# Patient Record
Sex: MALE | Race: BLACK OR AFRICAN AMERICAN | NOT HISPANIC OR LATINO | Employment: FULL TIME | URBAN - METROPOLITAN AREA
[De-identification: names, ages, dates, MRNs, and addresses within clinical notes are randomized per-mention and may not be internally consistent; named-entity substitution may affect disease eponyms.]

---

## 2023-11-01 NOTE — PROGRESS NOTES
[facility]ULT [facility]NUAL [facility]    NAME: Alonso Mead  AGE: 57 y.o. SEX: male  : 1975     DATE: 2023     Assessment and Plan:     Problem List Items Addressed This Visit    None  Visit Diagnoses       Annual physical exam    -  Primary    Screening for HIV (human immunodeficiency virus)        Need for hepatitis C screening test        Encounter for vaccination        Colon cancer screening        Prostate cancer screening                Immunizations and preventive care screenings were discussed with patient today. Appropriate education was printed on patient's after visit summary. {Prostate cancer screening - consider in males between age of 37-78 depending on age, race, and risk factors. There is difference in the guidelines in regards to the optimal age for screening. USPSTF states to consider periodic screening in males between the age of 48 to 71. This text is informational and does not need to be selected but if you wish, you can insert standard documentation in your progress note by using F2 (Optional):30016}    Counseling:  {Annual Physical; Counselin}         Patient was seen today for an annual physical exam. Age appropriate screening tests were done as above. Annual labs were ordered to be done through [facility]. Patient will be contacted regarding results and follow up will be based on findings. Patient was counseled on the importance of proper diet and exercise. I recommend diets rich in lean meats and leafy green vegetables. Try to have 150 minutes of exercise weekly at moderate intensity. See problem based charting for any chronic problems or new findings and current workup/management. 30 minutes were spent on chart review, examination, and plan of care. This note was dictated using software. No follow-ups on file.      Chief Complaint:     No chief complaint on file. History of Present Illness:     Adult Annual Physical   Patient here for a comprehensive physical exam. The patient reports {problems:18632}. Patient has a history of chronic xiphoid pain. He has had a work-up by his previous provider for cardiovascular, GI, lung based causes of pain which were unremarkable. He has been seeing a pain management doctor who has him on Lyrica 50 mg 3 times a day. This does help with some of his pain symptoms however he is seeking another opinion on what can be done for his symptoms. Diet and Physical Activity  Diet/Nutrition: {annual physical; diet:}. Exercise: {annual physical; exercise:2102}. Depression Screening  PHQ-2/9 Depression Screening           General Health  Sleep: {annual physical; sleep:2102}. Hearing: {annual physical; hearin}. Vision: {annual physical; vision:}. Dental: {annual physical; dental:}.  Health  Symptoms include: {annual physical; urinary symptoms:47156::"none"}    Advanced Care Planning  Do you have an advanced directive? {YES/NO:}  Do you have a durable medical power of ? {YES/NO:}     Review of Systems:     Review of Systems   Respiratory:  Negative for shortness of breath. Cardiovascular:  Negative for chest pain. Gastrointestinal:  Negative for abdominal pain, constipation, diarrhea, nausea and vomiting. Genitourinary:  Negative for difficulty urinating. Musculoskeletal:  Positive for arthralgias. Negative for back pain. Skin:  Negative for rash. Past Medical History:     No past medical history on file. Past Surgical History:     No past surgical history on file. Family History:     No family history on file.    Social History:     Social History     Socioeconomic History    Marital status: Single     Spouse name: Not on file    Number of children: Not on file    Years of education: Not on file    Highest education level: Not on file   Occupational History    Not on file   Tobacco Use    Smoking status: Not on file    Smokeless tobacco: Not on file   Substance and Sexual Activity    Alcohol use: Not on file    Drug use: Not on file    Sexual activity: Not on file   Other Topics Concern    Not on file   Social History Narrative    Not on file     Social Determinants of Health     Financial Resource Strain: Not on file   Food Insecurity: Not on file   Transportation Needs: Not on file   Physical Activity: Not on file   Stress: Not on file   Social Connections: Not on file   Intimate Partner Violence: Not on file   Housing Stability: Not on file      Current Medications:     No current outpatient medications on file. No current facility-administered medications for this visit. Allergies:     Not on File   Physical Exam:     There were no vitals taken for this visit. Physical Exam  Constitutional:       General: He is not in acute distress. Appearance: Normal appearance. He is obese. HENT:      Head: Normocephalic and atraumatic. Right Ear: Tympanic membrane, ear canal and external ear normal.      Left Ear: Tympanic membrane, ear canal and external ear normal.      Nose: Nose normal. No congestion. Mouth/Throat:      Mouth: Mucous membranes are moist.      Pharynx: Oropharynx is clear. No oropharyngeal exudate or posterior oropharyngeal erythema. Eyes:      Extraocular Movements: Extraocular movements intact. Conjunctiva/sclera: Conjunctivae normal.      Pupils: Pupils are equal, round, and reactive to light. Cardiovascular:      Rate and Rhythm: Normal rate and regular rhythm. Heart sounds: Normal heart sounds. No murmur heard. No friction rub. No gallop. Pulmonary:      Effort: Pulmonary effort is normal. No respiratory distress. Breath sounds: Normal breath sounds. No wheezing. Abdominal:      General: Abdomen is flat. Palpations: Abdomen is soft. Tenderness:  There is no abdominal tenderness. There is no guarding. Musculoskeletal:         General: Tenderness (xiphoid process) present. No swelling. Normal range of motion. Cervical back: Normal range of motion and neck supple. Lymphadenopathy:      Cervical: No cervical adenopathy. Skin:     General: Skin is warm and dry. Findings: No erythema or rash. Neurological:      General: No focal deficit present. Mental Status: He is alert and oriented to person, place, and time. Mental status is at baseline. Psychiatric:         Mood and Affect: Mood normal.         Behavior: Behavior normal.         Thought Content:  Thought content normal.         Judgment: Judgment normal.          Yordan Butler, DO  [address] WITH [facility]

## 2023-11-10 ENCOUNTER — TELEPHONE (OUTPATIENT)
Dept: FAMILY MEDICINE CLINIC | Facility: CLINIC | Age: 48
End: 2023-11-10

## 2023-11-10 ENCOUNTER — OFFICE VISIT (OUTPATIENT)
Dept: FAMILY MEDICINE CLINIC | Facility: CLINIC | Age: 48
End: 2023-11-10

## 2023-11-10 VITALS
TEMPERATURE: 98.3 F | HEIGHT: 75 IN | SYSTOLIC BLOOD PRESSURE: 144 MMHG | WEIGHT: 278 LBS | DIASTOLIC BLOOD PRESSURE: 86 MMHG | BODY MASS INDEX: 34.57 KG/M2 | HEART RATE: 66 BPM | OXYGEN SATURATION: 98 %

## 2023-11-10 DIAGNOSIS — R07.89 XIPHODYNIA: Primary | ICD-10-CM

## 2023-11-10 DIAGNOSIS — R07.89 OTHER CHEST PAIN: ICD-10-CM

## 2023-11-10 PROCEDURE — 99203 OFFICE O/P NEW LOW 30 MIN: CPT | Performed by: STUDENT IN AN ORGANIZED HEALTH CARE EDUCATION/TRAINING PROGRAM

## 2023-11-10 RX ORDER — ASCORBIC ACID 125 MG
TABLET,CHEWABLE ORAL
COMMUNITY

## 2023-11-10 RX ORDER — FLUTICASONE PROPIONATE 220 UG/1
AEROSOL, METERED RESPIRATORY (INHALATION)
COMMUNITY

## 2023-11-10 RX ORDER — BUSPIRONE HYDROCHLORIDE 15 MG/1
1 TABLET ORAL 3 TIMES DAILY
COMMUNITY
Start: 2023-09-29

## 2023-11-10 RX ORDER — OMEPRAZOLE 20 MG/1
CAPSULE, DELAYED RELEASE ORAL
COMMUNITY

## 2023-11-10 NOTE — PROGRESS NOTES
Name: Sam Ring      : 1975      MRN: [id number]  Encounter Provider: Evon Schilling DO  Encounter Date: 11/10/2023   Encounter department: [address] [place] [address] IN PARTNERSHIP WITH [facility]    Assessment & Plan     1. Xiphodynia  -     CT chest wo contrast; Future; Expected date: 11/10/2023    2. Other chest pain        Depression Screening and Follow-up Plan: Patient was screened for depression during today's encounter. They screened negative with a PHQ-2 score of 0. Patient is a 61-year-old male who is presenting today for a second opinion/evaluation for his chest pain. Review of his records over the past year shows that he has had an extensive cardiac evaluation along with basic x-ray imaging of the chest and epigastric evaluation with EGD. No causes of chest pain have been found at this time. He does have a history of COVID and describes some pain in the chest on deep breathing. His palpable pain is August around the xiphoid process. However I still have some concern about his lungs due to the pain on inspiration and he has had no detailed imaging since he had COVID in . I recommended a CT scan to evaluate the lungs. I will do a further review of his previous records. I did recommend a evaluation by a chiropractor as his symptoms may improve with an adjustment of the ribs. He will follow-up in 1 month for an annual visit and we will consider further evaluation/treatment at that time. 30 minutes spent on physical exam and plan of care. This note was dictated using software. Subjective      HPI  Review of Systems   Respiratory:  Negative for shortness of breath. Cardiovascular:  Negative for chest pain. Chest wall pain,pain on inspiration   Gastrointestinal:  Negative for abdominal pain. Genitourinary:  Negative for dysuria. Musculoskeletal:  Positive for arthralgias. Skin:  Negative for rash.      Patient is a 61-year-old male presenting for an acute visit to discuss chronic problems with chest pain focused around the xiphoid process. He states that he had COVID several years ago and then developed a pain in his chest which started about a year and a half prior. He was being evaluated by his previous primary care doctor who had patient do a heart evaluation with echo and stress testing. Cardiac testing was negative. He had an evaluation by gastrointestinal doctor who did an upper endoscopy at the same time as colonoscopy and findings were also negative. He has had recent x-rays of the sternum which were negative for acute findings. He also had an evaluation by a pain doctor who recommended possible injections however patient was nervous with having injections near the lungs. He is here today to discuss further evaluation or possibly a second opinion. Current Outpatient Medications on File Prior to Visit   Medication Sig    Albuterol-Budesonide 90-80 MCG/ACT AERO     busPIRone (BUSPAR) 15 mg tablet Take 1 tablet by mouth 3 (three) times a day    fluticasone (Flovent HFA) 220 mcg/act inhaler     Multiple Vitamins-Minerals (Multi Adult Gummies) CHEW     omeprazole (PriLOSEC) 20 mg delayed release capsule        Objective     /86 (BP Location: Right arm, Patient Position: Sitting, Cuff Size: Standard)   Pulse 66   Temp 98.3 °F (36.8 °C)   Ht 6' 3" (1.905 m)   Wt 126 kg (278 lb)   SpO2 98%   BMI 34.75 kg/m²     Physical Exam  Vitals and nursing note reviewed. Constitutional:       General: He is not in acute distress. Appearance: Normal appearance. He is not ill-appearing. HENT:      Head: Normocephalic and atraumatic. Right Ear: External ear normal.      Left Ear: External ear normal.      Nose: Nose normal.      Mouth/Throat:      Mouth: Mucous membranes are moist.   Eyes:      Extraocular Movements: Extraocular movements intact.       Conjunctiva/sclera: Conjunctivae normal.      Pupils: Pupils are equal, round, and reactive to light. Cardiovascular:      Rate and Rhythm: Normal rate and regular rhythm. Heart sounds: Normal heart sounds. No murmur heard. No friction rub. No gallop. Pulmonary:      Effort: Pulmonary effort is normal. No respiratory distress. Breath sounds: Normal breath sounds. No wheezing. Musculoskeletal:         General: Tenderness (tenderness focused around xiphoid process) present. Normal range of motion. Cervical back: Normal range of motion. Skin:     General: Skin is warm. Findings: No rash. Neurological:      General: No focal deficit present. Mental Status: He is alert and oriented to person, place, and time. Mental status is at baseline. Psychiatric:         Mood and Affect: Mood normal.         Behavior: Behavior normal.         Thought Content:  Thought content normal.         Judgment: Judgment normal.       Jone Schaeffer DO

## 2023-11-10 NOTE — TELEPHONE ENCOUNTER
[address] [id number]  TRACK [id number]    KACYD   11/10/23-12/10/23    CT TO BE COMPLETED AT [facility]

## 2023-12-04 ENCOUNTER — HOSPITAL ENCOUNTER (OUTPATIENT)
Dept: CT IMAGING | Facility: HOSPITAL | Age: 48
Discharge: HOME | End: 2023-12-04
Attending: STUDENT IN AN ORGANIZED HEALTH CARE EDUCATION/TRAINING PROGRAM
Payer: COMMERCIAL

## 2023-12-04 DIAGNOSIS — R07.89 XIPHODYNIA: ICD-10-CM

## 2023-12-04 PROCEDURE — G1004 CDSM NDSC: HCPCS

## 2023-12-04 PROCEDURE — 71250 CT THORAX DX C-: CPT

## 2023-12-05 PROBLEM — I10 HIGH BLOOD PRESSURE: Status: ACTIVE | Noted: 2023-12-05

## 2023-12-05 PROBLEM — F41.9 ANXIETY: Status: ACTIVE | Noted: 2020-09-14

## 2023-12-05 PROBLEM — J45.909 ASTHMA: Status: ACTIVE | Noted: 2023-12-05

## 2023-12-05 NOTE — PROGRESS NOTES
ADULT ANNUAL [facility] [place] [facility]    NAME: Laura Aguirre  AGE: 57 y.o. SEX: male  : 1975     DATE: 12/15/2023     Assessment and Plan:     Problem List Items Addressed This Visit       Xiphodynia     Patient has had a chronic xiphoid pain since after having COVID infection. He has been worked up with a cardiac workup, CT chest imaging, respiratory specialist evaluation. There were no cardiovascular abnormalities. 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 I did recommend trying an adjustment by chiropractor first.  He has a lot of hesitancy about seeing a pain specialist for injections as he is worried about puncturing the lung. We may consider talking about Cymbalta if chiropractor is not effective. I also recommended weight loss due to body habitus. Patient will work with our care manager on weight loss. Anxiety     Anxiety is chronic and currently controlled on BuSpar 15 mg 3 times daily. Asthma     Follows with specialty. He has had breathing problems since COVID. Continue on current inhalers. High blood pressure     Blood pressure in office is reduced today. Patient is not on any medications. Will refer to care management to work on diet and nutrition         Relevant Orders    POCT hemoglobin A1c    CBC and differential    Basic metabolic panel    Prediabetes     A1c was 5.8 today. Will refer to care management for diet and nutrition as patient is interested in weight loss tips.          Relevant Orders    Ambulatory referral to complex care management program     Other Visit Diagnoses       Annual physical exam    -  Primary    Relevant Orders    POCT hemoglobin A1c    CBC and differential    Basic metabolic panel    Encounter for vaccination        Flu shot declined    Colon cancer screening        Up to date    Prostate cancer screening        Relevant Orders    PSA, Total Screen    Obesity (BMI 30.0-34. 9)        Relevant Orders    Ambulatory referral to complex care management program            Immunizations and preventive care screenings were discussed with patient today. Appropriate education was printed on patient's after visit summary. Discussed risks and benefits of prostate cancer screening. We discussed the controversial history of PSA screening for prostate cancer in the [facility] as well as the risk of over detection and over treatment of prostate cancer by way of PSA screening. The patient understands that PSA blood testing is an imperfect way to screen for prostate cancer and that elevated PSA levels in the blood may also be caused by infection, inflammation, prostatic trauma or manipulation, urological procedures, or by benign prostatic enlargement. The role of the digital rectal examination in prostate cancer screening was also discussed and I discussed with him that there is large interobserver variability in the findings of digital rectal examination. Counseling:  Alcohol/drug use: discussed moderation in alcohol intake, the recommendations for healthy alcohol use, and avoidance of illicit drug use. Dental Health: discussed importance of regular tooth brushing, flossing, and dental visits. Exercise: the importance of regular exercise/physical activity was discussed. Recommend exercise 3-5 times per week for at least 30 minutes. BMI Counseling: Body mass index is 34.97 kg/m². The BMI is above normal. Nutrition recommendations include encouraging healthy choices of fruits and vegetables. Exercise recommendations include moderate physical activity 150 minutes/week. Rationale for BMI follow-up plan is due to patient being overweight or obese. Return in about 3 months (around 3/15/2024) for Recheck, a1c and weight check.      Chief Complaint:     Chief Complaint   Patient presents with    Physical Exam     Est care  A1c        History of Present Illness:     Adult Annual Physical   Patient here for a comprehensive physical exam. The patient reports  follow up xiphoid pain, chronic conditions . Diet and Physical Activity  Diet/Nutrition:  meats and veggies, low salt . Exercise: walking. Depression Screening  PHQ-2/9 Depression Screening           General Health  Sleep: sleeps well. Hearing: normal - bilateral.  Vision:  needs reading glasses . Dental: regular dental visits.  Health  Symptoms include: none         Review of Systems:     Review of Systems   Respiratory:  Negative for shortness of breath. Cardiovascular:  Negative for chest pain. Gastrointestinal:  Negative for abdominal pain, constipation and diarrhea. Genitourinary:  Negative for difficulty urinating. Musculoskeletal:  Positive for arthralgias. Skin:  Negative for rash.       Past Medical History:     Past Medical History:   Diagnosis Date    Anxiety     Asthma     Hypertension       Past Surgical History:     Past Surgical History:   Procedure Laterality Date    KNEE SURGERY        Family History:     Family History   Problem Relation Age of Onset    Alcohol abuse Father     Diabetes Maternal Grandmother     Breast cancer Sister       Social History:     Social History     Socioeconomic History    Marital status: Single     Spouse name: None    Number of children: None    Years of education: None    Highest education level: None   Occupational History    None   Tobacco Use    Smoking status: Former     Current packs/day: 0.25     Average packs/day: 0.3 packs/day for 10.0 years (2.5 ttl pk-yrs)     Types: Cigarettes    Smokeless tobacco: Never   Substance and Sexual Activity    Alcohol use: Never    Drug use: Never    Sexual activity: Yes     Partners: Female     Birth control/protection: OCP   Other Topics Concern    None   Social History Narrative    None     Social Determinants of Health     Financial Resource Strain: Low Risk  (7/20/2023)    Received from [address] Strain (CARDIA)     Difficulty of Paying Living Expenses: Not hard at all   Food Insecurity: No Food Insecurity (7/20/2023)    Received from [address] Vital Sign     Worried About [place] in the Last Year: Never true     [address] in the Last Year: Never true   Transportation Needs: No Transportation Needs (7/20/2023)    Received from [facility] of Transportation (Medical): No     Lack of Transportation (Non-Medical): No   Physical Activity: Sufficiently Active (7/20/2023)    Received from [facility]    Exercise Vital Sign     Days of Exercise per Week: 3 days     Minutes of Exercise per Session: 60 min   Stress: No Stress Concern Present (7/20/2023)    Received from [email or web address]     Feeling of Stress : Only a little   Social Connections:  Moderately Integrated (7/20/2023)    Received from [address] [facility] and Isolation Panel [NHANES]     Frequency of Communication with Friends and Family: More than three times a week     Frequency of Social Gatherings with Friends and Family: More than three times a week     Attends Denominational Services: Never     Active Member of Clubs or Organizations: Yes     Attends Club or Organization Meetings: More than 4 times per year     Marital Status: Living with partner   Intimate Partner Violence: Not At Risk (7/20/2023)    Received from [employer], [address], Rape, and Kick questionnaire     Fear of Current or Ex-Partner: No     Emotionally Abused: No     Physically Abused: No     Sexually Abused: No   Housing Stability: Low Risk  (7/20/2023)    Received from [facility] Stability Vital Sign     Unable to Pay for Housing in the Last Year: No     Number of Places Lived in the Last Year: 1     Unstable Housing in the Last Year: No      Current Medications:     Current Outpatient Medications   Medication Sig Dispense Refill    Albuterol-Budesonide 90-80 MCG/ACT AERO       busPIRone (BUSPAR) 15 mg tablet Take 1 tablet by mouth 3 (three) times a day      fluticasone (Flovent HFA) 220 mcg/act inhaler       Multiple Vitamins-Minerals (Multi Adult Gummies) CHEW       omeprazole (PriLOSEC) 20 mg delayed release capsule        No current facility-administered medications for this visit. Allergies:     No Known Allergies   Physical Exam:     /82 (BP Location: Left arm, Patient Position: Sitting, Cuff Size: Large)   Pulse 74   Ht 6' 3" (1.905 m)   Wt 127 kg (279 lb 12.8 oz)   SpO2 97%   BMI 34.97 kg/m²     Physical Exam  Constitutional:       General: He is not in acute distress. Appearance: Normal appearance. He is obese. HENT:      Head: Normocephalic and atraumatic. Right Ear: Tympanic membrane, ear canal and external ear normal.      Left Ear: Tympanic membrane, ear canal and external ear normal.      Nose: Nose normal. No congestion. Mouth/Throat:      Mouth: Mucous membranes are moist.      Pharynx: Oropharynx is clear. No oropharyngeal exudate or posterior oropharyngeal erythema. Eyes:      Extraocular Movements: Extraocular movements intact. Conjunctiva/sclera: Conjunctivae normal.      Pupils: Pupils are equal, round, and reactive to light. Cardiovascular:      Rate and Rhythm: Normal rate and regular rhythm. Heart sounds: Normal heart sounds. No murmur heard. No friction rub. No gallop. Pulmonary:      Effort: Pulmonary effort is normal. No respiratory distress. Breath sounds: Normal breath sounds. No wheezing. Abdominal:      General: Abdomen is flat. Palpations: Abdomen is soft. Tenderness: There is no abdominal tenderness. There is no guarding. Musculoskeletal:         General: Tenderness (xiphoid tenderness) present. Normal range of motion. Cervical back: Normal range of motion and neck supple. Lymphadenopathy:      Cervical: No cervical adenopathy. Skin:     General: Skin is warm and dry. Findings: No erythema or rash. Neurological:      General: No focal deficit present. Mental Status: He is alert and oriented to person, place, and time. Mental status is at baseline. Psychiatric:         Mood and Affect: Mood normal.         Behavior: Behavior normal.         Thought Content:  Thought content normal.         Judgment: Judgment normal.          Marlinda Hockey, DO  [address] WITH [facility]

## 2023-12-13 ENCOUNTER — TELEPHONE (OUTPATIENT)
Dept: ADMINISTRATIVE | Facility: OTHER | Age: 48
End: 2023-12-13

## 2023-12-13 NOTE — LETTER
Procedure Request Form: Colonoscopy      Date Requested: 23  Patient: Anam Ch  Patient : 1975   Referring Provider: No primary care provider on file. Date of Procedure ______________________________       The above patient has informed us that they have completed their   most recent Colonoscopy at your facility. Please complete   this form and attach all corresponding procedure reports/results. Comments __________________________________________________________  ____________________________________________________________________  ____________________________________________________________________  ____________________________________________________________________    Facility Completing Procedure _________________________________________    Form Completed By (print name) _______________________________________      Signature __________________________________________________________      These reports are needed for  compliance. Please fax this completed form and a copy of the procedure report to our office located at [address] [place] [address] as soon as possible to Fax [phone] attention Gregory Medley: Phone [phone]    We thank you for your assistance in treating our mutual patient.

## 2023-12-13 NOTE — TELEPHONE ENCOUNTER
Upon review of the In Basket request and the patient's chart, initial outreach has been made via fax to facility. Please see Contacts section for details.      Thank you  Clyde Ashley

## 2023-12-13 NOTE — TELEPHONE ENCOUNTER
----- Message from Mando Lim sent at 12/13/2023 10:01 AM EST -----  Regarding: care gap request  01/04/23 1:48 PM    Hello, our patient attached above CRC: Colonoscopy completed/performed. Please assist in updating the patient chart by pulling the Care Everywhere (CE) document. The date of service is 12/13/23.      Thank you,  Mando Lim  [facility]

## 2023-12-14 NOTE — TELEPHONE ENCOUNTER
Upon review of the In Basket request we were able to locate, review, and update the patient chart as requested for CRC: Colonoscopy. Repeat colonoscopy 5 years, health maintenance has been updated. Any additional questions or concerns should be emailed to the Practice Liaisons via the appropriate education email address, please do not reply via In Basket.     Thank you  Brandie Marie

## 2023-12-15 ENCOUNTER — OFFICE VISIT (OUTPATIENT)
Dept: FAMILY MEDICINE CLINIC | Facility: CLINIC | Age: 48
End: 2023-12-15

## 2023-12-15 VITALS
DIASTOLIC BLOOD PRESSURE: 82 MMHG | BODY MASS INDEX: 34.79 KG/M2 | HEIGHT: 75 IN | HEART RATE: 74 BPM | WEIGHT: 279.8 LBS | OXYGEN SATURATION: 97 % | SYSTOLIC BLOOD PRESSURE: 130 MMHG

## 2023-12-15 DIAGNOSIS — R07.89 XIPHODYNIA: ICD-10-CM

## 2023-12-15 DIAGNOSIS — Z12.5 PROSTATE CANCER SCREENING: ICD-10-CM

## 2023-12-15 DIAGNOSIS — Z00.00 ANNUAL PHYSICAL EXAM: Primary | ICD-10-CM

## 2023-12-15 DIAGNOSIS — I10 HYPERTENSION, UNSPECIFIED TYPE: ICD-10-CM

## 2023-12-15 DIAGNOSIS — R73.03 PREDIABETES: ICD-10-CM

## 2023-12-15 DIAGNOSIS — E66.9 OBESITY (BMI 30.0-34.9): ICD-10-CM

## 2023-12-15 DIAGNOSIS — F41.9 ANXIETY: ICD-10-CM

## 2023-12-15 DIAGNOSIS — Z12.11 COLON CANCER SCREENING: ICD-10-CM

## 2023-12-15 DIAGNOSIS — J45.909 UNCOMPLICATED ASTHMA, UNSPECIFIED ASTHMA SEVERITY, UNSPECIFIED WHETHER PERSISTENT: ICD-10-CM

## 2023-12-15 DIAGNOSIS — Z23 ENCOUNTER FOR VACCINATION: ICD-10-CM

## 2023-12-15 LAB — SL AMB POCT HEMOGLOBIN AIC: 5.8 (ref ?–6.5)

## 2023-12-15 PROCEDURE — 99396 PREV VISIT EST AGE 40-64: CPT | Performed by: STUDENT IN AN ORGANIZED HEALTH CARE EDUCATION/TRAINING PROGRAM

## 2023-12-15 PROCEDURE — 99213 OFFICE O/P EST LOW 20 MIN: CPT | Performed by: STUDENT IN AN ORGANIZED HEALTH CARE EDUCATION/TRAINING PROGRAM

## 2023-12-15 PROCEDURE — 83036 HEMOGLOBIN GLYCOSYLATED A1C: CPT | Performed by: STUDENT IN AN ORGANIZED HEALTH CARE EDUCATION/TRAINING PROGRAM

## 2023-12-15 NOTE — ASSESSMENT & PLAN NOTE
A1c was 5.8 today. Will refer to care management for diet and nutrition as patient is interested in weight loss tips.

## 2023-12-15 NOTE — ASSESSMENT & PLAN NOTE
Blood pressure in office is reduced today. Patient is not on any medications.   Will refer to care management to work on diet and nutrition

## 2023-12-15 NOTE — ASSESSMENT & PLAN NOTE
Patient has had a chronic xiphoid pain since after having COVID infection. He has been worked up with a cardiac workup, CT chest imaging, respiratory specialist evaluation. There were no cardiovascular abnormalities. 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 I did recommend trying an adjustment by chiropractor first.  He has a lot of hesitancy about seeing a pain specialist for injections as he is worried about puncturing the lung. We may consider talking about Cymbalta if chiropractor is not effective. I also recommended weight loss due to body habitus. Patient will work with our care manager on weight loss.

## 2023-12-18 ENCOUNTER — PATIENT OUTREACH (OUTPATIENT)
Dept: FAMILY MEDICINE CLINIC | Facility: CLINIC | Age: 48
End: 2023-12-18

## 2023-12-21 ENCOUNTER — PATIENT OUTREACH (OUTPATIENT)
Dept: FAMILY MEDICINE CLINIC | Facility: CLINIC | Age: 48
End: 2023-12-21

## 2023-12-21 NOTE — PROGRESS NOTES
Patient answered phone during scheduled outreach call, but then stated he would call back in 6 min. 30 min later he did not call back. [employer] sent to patient to reschedule outreach. If no response will reach out again in 2 weeks.

## 2023-12-28 ENCOUNTER — PATIENT OUTREACH (OUTPATIENT)
Dept: FAMILY MEDICINE CLINIC | Facility: CLINIC | Age: 48
End: 2023-12-28

## 2023-12-28 NOTE — PROGRESS NOTES
Patient called and left message to return call from missed outreach 12/21. Called patient, he did not answer. Message left to either return call or reply to [employer] message. [employer] # provided if he is having problems accessing. Office hours left as well. Will await his return call to reschedule initial call.

## 2024-01-10 ENCOUNTER — PATIENT OUTREACH (OUTPATIENT)
Dept: FAMILY MEDICINE CLINIC | Facility: CLINIC | Age: 49
End: 2024-01-10

## 2024-01-10 NOTE — PROGRESS NOTES
Patient initially answered and scheduled a call. On day of call patient answered, but stated he couldn't talk and would call back in 5 min. He called 2 days later and left message that he was available anytime between Christmas and New Year break. CM called again during this time, patient did not answer, message left and mychart message sent. Patient has failed to engage. Message sent letting patient know his cased is closed due to inactivity at this time.

## 2024-01-23 ENCOUNTER — PATIENT OUTREACH (OUTPATIENT)
Age: 49
End: 2024-01-23

## 2024-01-23 NOTE — PROGRESS NOTES
Patient was referred previously to care management, but missed one outreach and never scheduled again. Case was closed 1/10. Patient reached out today to come back. Due to closure over 10days new referral is needed. Sent response that he reach out to provider for new referral.

## 2024-02-07 DIAGNOSIS — R73.03 PREDIABETES: ICD-10-CM

## 2024-02-07 DIAGNOSIS — E66.9 OBESITY (BMI 30.0-34.9): Primary | ICD-10-CM

## 2024-02-08 ENCOUNTER — PATIENT OUTREACH (OUTPATIENT)
Dept: FAMILY MEDICINE CLINIC | Facility: CLINIC | Age: 49
End: 2024-02-08

## 2024-02-22 ENCOUNTER — PATIENT OUTREACH (OUTPATIENT)
Dept: FAMILY MEDICINE CLINIC | Facility: CLINIC | Age: 49
End: 2024-02-22

## 2024-02-22 NOTE — PROGRESS NOTES
Called patient 2/8 and sent [employer] message in regards to second referral placed for CM. Prediabetes DX. Unable to reach letter sent via [employer] today. No further attempts will be made.

## 2024-02-22 NOTE — LETTER
Date: 02/22/24    Dear Wil Anaya,   I have not been able to reach you and would like to set a time that I can talk with you over the phone or in-person.  Please call me or respond to the my chart message in order to schedule an outreach call.   Sincerely,  Vera MSN RN  [phone]  Outpatient Care Manager

## 2024-02-27 ENCOUNTER — PATIENT OUTREACH (OUTPATIENT)
Age: 49
End: 2024-02-27

## 2024-02-27 NOTE — PROGRESS NOTES
Patient returned call from second referral for CM services for pre-diabetes. Left message for best time to reach. Cm returned call,Intial outreach scheduled 3/5

## 2024-03-05 ENCOUNTER — PATIENT OUTREACH (OUTPATIENT)
Age: 49
End: 2024-03-05

## 2024-03-05 NOTE — PROGRESS NOTES
Spoke with patient during scheduled outreach. Patient is fair with water drinking at least 64oz a day, no coffee, tea or soda. Patient did state that on a weekend he can easily drink a 6 pack + of beer. Explained hydration protocol and asked him to increase water and compensate for beer on weekend. Provided overview of calorie tracking and requested that he send calories for review and response 3/11. Patient verbalized understanding. Next outreach scheduled for 3/25.

## 2024-03-05 NOTE — PROGRESS NOTES
Assessment/Plan:    High blood pressure  Patient is currently doing well with blood pressure and is not on medications currently.  Will continue to monitor intermittently.    Xiphodynia  Patient's chronic xiphoid pain has been improving as he is slowly becoming more active and exercising.  He feels comforted now that he knows that this is not heart related and has been trying to change his diet as well working with our care management.  He does feel better overall mentally and physically.    Prediabetes  Continue to follow with care management.  I do believe a large portion of his sugar is due to alcohol use.  He does not drink during the week but on the weekends he may have 6 beers on 1 day.  I believe this is also the cause of his low potassium.  I did recommend to continue a multivitamin that contains potassium daily.       Diagnoses and all orders for this visit:    Obesity (BMI 30.0-34.9)    Hypertension, unspecified type    Prediabetes  -     POCT hemoglobin A1c    Xiphodynia    Other orders  -     meloxicam (MOBIC) 15 mg tablet; Take 15 mg by mouth daily (Patient not taking: Reported on 3/15/2024)          Patient is a 49-year-old male who is presenting today for follow-up on weight and A1c today.  Weight has not improved since last visit.  He did just start working with care management and is starting to track his calories.  I did encourage him that it will take some time to notice changes from diet and lifestyle.  I did recommend to try and cut down on the amount of alcohol he drinks on the weekends.  He had a question about meloxicam and I did advise him that it should not interact with his buspirone.  I did recommend taking it with food and not using with other NSAIDs.  He should use this for flareups and use Tylenol for any low amounts of aches or pains.  He should continue to stay active.  As of right now I will plan to recheck his A1c in 4 to 5 months.  He will need some more time to continue working  with care management before we recheck this.    20 minutes spent on physical exam and plan of care.  This note was dictated using software.    Subjective:      Patient ID: Wil Anaya is a 49 y.o. male.    HPI    The following portions of the patient's history were reviewed and updated as appropriate: allergies, current medications, past family history, past medical history, past social history, past surgical history, and problem list.    Patient is a 49-year-old male who is presenting today for follow-up on obesity, prediabetes.  He was referred to care management.    We are also monitoring patient's blood pressure and he continues to have symptoms of xiphoid pain.  I recommended possible evaluation by chiropractor or medical massage therapy.  I do believe some weight loss might also assist with his symptoms.    Patient states that he has been feeling better since starting to exercise and working with our care manager.  He has been starting to try and decrease his daily calorie intake.  A large portion of his calorie intake probably comes from his alcohol use on the weekends.  I do believe this also plays a part in his some low potassium.  He is currently trying to track and control his calories at 0380-8080 a day.  I also recommended he try to decrease alcohol use to 4-5 beers a day on the weekends to start.    He also has a question about starting meloxicam for pain in his knee.  He was given this by an orthopedic doctor and wanted to know if there were any interactions with his buspirone.      Review of Systems   Respiratory:  Negative for shortness of breath.    Cardiovascular:  Negative for chest pain.   Gastrointestinal:  Negative for abdominal pain, constipation and diarrhea.   Genitourinary:  Negative for difficulty urinating.   Musculoskeletal:  Positive for arthralgias.   Skin:  Negative for rash.         Objective:      /84 (BP Location: Right arm, Patient Position: Sitting, Cuff Size: Large)   " Pulse 73   Temp 97.8 °F (36.6 °C)   Ht 6' 3\" (1.905 m)   Wt 128 kg (282 lb 12.8 oz)   SpO2 98%   BMI 35.35 kg/m²          Physical Exam  Vitals and nursing note reviewed.   Constitutional:       General: He is not in acute distress.     Appearance: Normal appearance.   HENT:      Head: Normocephalic and atraumatic.      Right Ear: External ear normal.      Left Ear: External ear normal.      Nose: Nose normal.      Mouth/Throat:      Mouth: Mucous membranes are moist.      Pharynx: Oropharynx is clear.   Eyes:      Extraocular Movements: Extraocular movements intact.      Conjunctiva/sclera: Conjunctivae normal.      Pupils: Pupils are equal, round, and reactive to light.   Cardiovascular:      Rate and Rhythm: Normal rate and regular rhythm.      Heart sounds: Normal heart sounds. No murmur heard.     No friction rub. No gallop.   Pulmonary:      Effort: Pulmonary effort is normal. No respiratory distress.      Breath sounds: Normal breath sounds. No wheezing.   Musculoskeletal:         General: Normal range of motion.      Cervical back: Normal range of motion.   Skin:     General: Skin is warm and dry.      Findings: No rash.   Neurological:      General: No focal deficit present.      Mental Status: He is alert and oriented to person, place, and time. Mental status is at baseline.   Psychiatric:         Mood and Affect: Mood normal.         Behavior: Behavior normal.         Thought Content: Thought content normal.         Judgment: Judgment normal.           "

## 2024-03-11 ENCOUNTER — PATIENT OUTREACH (OUTPATIENT)
Age: 49
End: 2024-03-11

## 2024-03-11 NOTE — PROGRESS NOTES
Patient sent daily calories for review and response. He ranged anywhere from 1902 to 3209. Suggested a daily calorie of 9620-0382. Next outreach scheduled 3/25

## 2024-03-14 LAB
BASOPHILS # BLD AUTO: 28 CELLS/UL (ref 0–200)
BASOPHILS NFR BLD AUTO: 0.5 %
BUN SERPL-MCNC: 15 MG/DL (ref 7–25)
BUN/CREAT SERPL: ABNORMAL (CALC) (ref 6–22)
CALCIUM SERPL-MCNC: 9.4 MG/DL (ref 8.6–10.3)
CHLORIDE SERPL-SCNC: 100 MMOL/L (ref 98–110)
CO2 SERPL-SCNC: 31 MMOL/L (ref 20–32)
CREAT SERPL-MCNC: 0.93 MG/DL (ref 0.6–1.29)
EOSINOPHIL # BLD AUTO: 138 CELLS/UL (ref 15–500)
EOSINOPHIL NFR BLD AUTO: 2.5 %
ERYTHROCYTE [DISTWIDTH] IN BLOOD BY AUTOMATED COUNT: 12.9 % (ref 11–15)
GFR/BSA.PRED SERPLBLD CYS-BASED-ARV: 102 ML/MIN/1.73M2
GLUCOSE SERPL-MCNC: 105 MG/DL (ref 65–99)
HCT VFR BLD AUTO: 45.6 % (ref 38.5–50)
HGB BLD-MCNC: 15.4 G/DL (ref 13.2–17.1)
LYMPHOCYTES # BLD AUTO: 1716 CELLS/UL (ref 850–3900)
LYMPHOCYTES NFR BLD AUTO: 31.2 %
MCH RBC QN AUTO: 30.3 PG (ref 27–33)
MCHC RBC AUTO-ENTMCNC: 33.8 G/DL (ref 32–36)
MCV RBC AUTO: 89.6 FL (ref 80–100)
MONOCYTES # BLD AUTO: 369 CELLS/UL (ref 200–950)
MONOCYTES NFR BLD AUTO: 6.7 %
NEUTROPHILS # BLD AUTO: 3251 CELLS/UL (ref 1500–7800)
NEUTROPHILS NFR BLD AUTO: 59.1 %
PLATELET # BLD AUTO: 213 THOUSAND/UL (ref 140–400)
PMV BLD REES-ECKER: 10.9 FL (ref 7.5–12.5)
POTASSIUM SERPL-SCNC: 3.3 MMOL/L (ref 3.5–5.3)
PSA SERPL-MCNC: 0.51 NG/ML
RBC # BLD AUTO: 5.09 MILLION/UL (ref 4.2–5.8)
SODIUM SERPL-SCNC: 140 MMOL/L (ref 135–146)
WBC # BLD AUTO: 5.5 THOUSAND/UL (ref 3.8–10.8)

## 2024-03-15 ENCOUNTER — OFFICE VISIT (OUTPATIENT)
Dept: FAMILY MEDICINE CLINIC | Facility: CLINIC | Age: 49
End: 2024-03-15

## 2024-03-15 VITALS
BODY MASS INDEX: 35.16 KG/M2 | HEIGHT: 75 IN | WEIGHT: 282.8 LBS | TEMPERATURE: 97.8 F | OXYGEN SATURATION: 98 % | HEART RATE: 73 BPM | DIASTOLIC BLOOD PRESSURE: 84 MMHG | SYSTOLIC BLOOD PRESSURE: 128 MMHG

## 2024-03-15 DIAGNOSIS — R07.89 XIPHODYNIA: ICD-10-CM

## 2024-03-15 DIAGNOSIS — E66.9 OBESITY (BMI 30.0-34.9): Primary | ICD-10-CM

## 2024-03-15 DIAGNOSIS — R73.03 PREDIABETES: ICD-10-CM

## 2024-03-15 DIAGNOSIS — I10 HYPERTENSION, UNSPECIFIED TYPE: ICD-10-CM

## 2024-03-15 LAB — SL AMB POCT HEMOGLOBIN AIC: 5.8 (ref ?–6.5)

## 2024-03-15 PROCEDURE — 99213 OFFICE O/P EST LOW 20 MIN: CPT | Performed by: STUDENT IN AN ORGANIZED HEALTH CARE EDUCATION/TRAINING PROGRAM

## 2024-03-15 PROCEDURE — 83036 HEMOGLOBIN GLYCOSYLATED A1C: CPT | Performed by: STUDENT IN AN ORGANIZED HEALTH CARE EDUCATION/TRAINING PROGRAM

## 2024-03-15 RX ORDER — MELOXICAM 15 MG/1
15 TABLET ORAL DAILY
COMMUNITY
Start: 2024-02-20 | End: 2024-05-20

## 2024-03-15 NOTE — ASSESSMENT & PLAN NOTE
Continue to follow with care management.  I do believe a large portion of his sugar is due to alcohol use.  He does not drink during the week but on the weekends he may have 6 beers on 1 day.  I believe this is also the cause of his low potassium.  I did recommend to continue a multivitamin that contains potassium daily.

## 2024-03-15 NOTE — ASSESSMENT & PLAN NOTE
Patient is currently doing well with blood pressure and is not on medications currently.  Will continue to monitor intermittently.

## 2024-03-15 NOTE — ASSESSMENT & PLAN NOTE
Patient's chronic xiphoid pain has been improving as he is slowly becoming more active and exercising.  He feels comforted now that he knows that this is not heart related and has been trying to change his diet as well working with our care management.  He does feel better overall mentally and physically.

## 2024-03-25 ENCOUNTER — PATIENT OUTREACH (OUTPATIENT)
Age: 49
End: 2024-03-25

## 2024-03-25 NOTE — PROGRESS NOTES
Spoke with patient during scheduled outreach. He was below the suggested calorie goal most days. He was only over with the 3 beers on the weekend. Talked about lighter beer options. Also, stated that calories need to be more consistent. Patient felt that he could be more consistent with a goal of 6373-0230. Talked about alternate food options and things to drive down the calories. Next outreach 4/22

## 2024-04-22 ENCOUNTER — PATIENT OUTREACH (OUTPATIENT)
Age: 49
End: 2024-04-22

## 2024-04-22 NOTE — PROGRESS NOTES
Called patient during scheduled outreach time. No answer, voicemail left with callback information. My Chart message sent with response request for best day and time to reschedule outreach. Will reach out again if no response.

## 2024-04-30 ENCOUNTER — PATIENT OUTREACH (OUTPATIENT)
Age: 49
End: 2024-04-30

## 2024-04-30 NOTE — PROGRESS NOTES
Spoke with patient during scheduled outreach. Over the last month patient was trending his daily calories down and settled around 1600. Talked about daily consistency being the key to change and sticking with that for now. Also, covered macros and provided overview with % and daily grams. Patient asked that CM send to him in [employer] message. He seems to have the fat at a good spot but protein was low. Talked about utilizing the protein shakes to help boost. He is still working on finding a better tasting light beer! Next phone outreach 5/30

## 2024-05-21 DIAGNOSIS — F41.9 ANXIETY: Primary | ICD-10-CM

## 2024-05-22 RX ORDER — BUSPIRONE HYDROCHLORIDE 15 MG/1
15 TABLET ORAL 2 TIMES DAILY
Qty: 180 TABLET | Refills: 3 | Status: SHIPPED | OUTPATIENT
Start: 2024-05-22

## 2024-05-30 ENCOUNTER — PATIENT OUTREACH (OUTPATIENT)
Dept: FAMILY MEDICINE CLINIC | Facility: CLINIC | Age: 49
End: 2024-05-30

## 2024-05-30 NOTE — PROGRESS NOTES
Spoke with patient during scheduled outreach. He is down to 261 per home scale losing over 20lbs. He is excited to see A1c at next provider appt 7/24. He is eating around 1600 calories and balancing macros. He has not found a protein shake that he is passionate about but his son is helping him find blends to make. He is also exploring new food recipes. He is feeling great and appreciative our journey guidance. Patient would like to remain with care management until after his provider follow up appt. Next outreach 6/27.

## 2024-06-27 ENCOUNTER — PATIENT OUTREACH (OUTPATIENT)
Dept: FAMILY MEDICINE CLINIC | Facility: CLINIC | Age: 49
End: 2024-06-27

## 2024-06-27 NOTE — PROGRESS NOTES
Spoke with patient during scheduled outreach. He has lost 27lbs at this point and his goal is 40. He is at 1600 calories and is fairly balanced. He has a follow up appt with the provider to recheck his A1c on 7/24. Next phone outreach 7/29 if at that time A1c is in goal will provide maintenance guidelines and close case.

## 2024-07-20 NOTE — PROGRESS NOTES
"Ambulatory Visit  Name: Wil Anaya      : 1975      MRN: [id number]  Encounter Provider: Raul Ashton DO  Encounter Date: 2024   Encounter department: [facility] IN PARTNERSHIP WITH [facility]    Assessment & Plan   1. Primary hypertension  -     POCT hemoglobin A1c  2. Obesity (BMI 30.0-34.9)  -     CBC and differential; Future; Expected date: 2024  -     Comprehensive metabolic panel; Future; Expected date: 2024  -     Lipid Panel with Direct LDL reflex; Future; Expected date: 2024  -     CBC and differential  -     Comprehensive metabolic panel  -     Lipid Panel with Direct LDL reflex  3. Prostate cancer screening  -     PSA, Total Screen; Future; Expected date: 2024  -     PSA, Total Screen         Patient is a 49-year-old male who is presenting today to check A1c level today. He is doing well with lifestyle changes. He is no longer prediabetic. He will monitor salt intake at home. Will follow up in December for annual.    History of Present Illness     HPI    Review of Systems   Constitutional:  Negative for fever.   Respiratory:  Negative for shortness of breath.    Cardiovascular:  Negative for chest pain.   Gastrointestinal:  Negative for abdominal pain, constipation and diarrhea.   Skin:  Negative for rash.     Is a 49-year-old male who is presenting today to the office for an A1c check.  He has been doing a great job working with care management and changing his lifestyle and losing weight.    Objective     /86   Pulse 84   Ht 6' 3\" (1.905 m)   Wt 117 kg (258 lb)   SpO2 100%   BMI 32.25 kg/m²     Physical Exam  Constitutional:       General: He is not in acute distress.     Appearance: Normal appearance. He is obese.   HENT:      Head: Normocephalic and atraumatic.      Right Ear: External ear normal.      Left Ear: External ear normal.      Nose: Nose normal.      Mouth/Throat:      Mouth: Mucous membranes " are moist.      Pharynx: Oropharynx is clear.   Eyes:      Extraocular Movements: Extraocular movements intact.      Conjunctiva/sclera: Conjunctivae normal.      Pupils: Pupils are equal, round, and reactive to light.   Cardiovascular:      Rate and Rhythm: Normal rate and regular rhythm.      Heart sounds: Normal heart sounds. No murmur heard.     No friction rub. No gallop.   Pulmonary:      Effort: Pulmonary effort is normal. No respiratory distress.      Breath sounds: Normal breath sounds. No wheezing.   Musculoskeletal:         General: Normal range of motion.      Cervical back: Normal range of motion and neck supple.   Skin:     General: Skin is warm and dry.      Findings: No erythema or rash.   Neurological:      General: No focal deficit present.      Mental Status: He is alert and oriented to person, place, and time. Mental status is at baseline.   Psychiatric:         Mood and Affect: Mood normal.         Behavior: Behavior normal.         Thought Content: Thought content normal.         Judgment: Judgment normal.       Administrative Statements   I have spent a total time of 20 minutes in caring for this patient on the day of the visit/encounter including Instructions for management, Documenting in the medical record, and Obtaining or reviewing history  .

## 2024-07-23 ENCOUNTER — PATIENT OUTREACH (OUTPATIENT)
Age: 49
End: 2024-07-23

## 2024-07-23 NOTE — PROGRESS NOTES
During last phone outreach we were going to talk following office visit with provider which was moved due to provider appt. Sent response with availability on 8/1. Will await a reply

## 2024-07-30 ENCOUNTER — OFFICE VISIT (OUTPATIENT)
Dept: FAMILY MEDICINE CLINIC | Facility: CLINIC | Age: 49
End: 2024-07-30

## 2024-07-30 VITALS
OXYGEN SATURATION: 100 % | HEART RATE: 84 BPM | WEIGHT: 258 LBS | HEIGHT: 75 IN | SYSTOLIC BLOOD PRESSURE: 138 MMHG | BODY MASS INDEX: 32.08 KG/M2 | DIASTOLIC BLOOD PRESSURE: 86 MMHG

## 2024-07-30 DIAGNOSIS — I10 PRIMARY HYPERTENSION: Primary | ICD-10-CM

## 2024-07-30 DIAGNOSIS — Z12.5 PROSTATE CANCER SCREENING: ICD-10-CM

## 2024-07-30 DIAGNOSIS — E66.9 OBESITY (BMI 30.0-34.9): ICD-10-CM

## 2024-07-30 PROBLEM — R73.03 PREDIABETES: Status: RESOLVED | Noted: 2023-12-15 | Resolved: 2024-07-30

## 2024-07-30 LAB — SL AMB POCT HEMOGLOBIN AIC: 5.2 (ref ?–6.5)

## 2024-07-30 PROCEDURE — 83036 HEMOGLOBIN GLYCOSYLATED A1C: CPT | Performed by: STUDENT IN AN ORGANIZED HEALTH CARE EDUCATION/TRAINING PROGRAM

## 2024-07-30 PROCEDURE — 99213 OFFICE O/P EST LOW 20 MIN: CPT | Performed by: STUDENT IN AN ORGANIZED HEALTH CARE EDUCATION/TRAINING PROGRAM

## 2024-08-01 ENCOUNTER — PATIENT OUTREACH (OUTPATIENT)
Dept: FAMILY MEDICINE CLINIC | Facility: CLINIC | Age: 49
End: 2024-08-01

## 2024-08-01 NOTE — PROGRESS NOTES
Called patient during scheduled outreach. He lost 24lbs and A1c moved him out of the diabetic range. He has done a fantastic job and improving his health. All questions and concerns were answered and at this point case will be closed. Final [employer] message sent and let patient know he can always reach out.

## 2024-09-23 DIAGNOSIS — Z11.3 SCREENING FOR STD (SEXUALLY TRANSMITTED DISEASE): Primary | ICD-10-CM

## 2024-09-25 LAB
C TRACH RRNA SPEC QL NAA+PROBE: NOT DETECTED
HAV IGM SERPL QL IA: NORMAL
HBV CORE IGM SERPL QL IA: NORMAL
HBV SURFACE AG SERPL QL IA: NORMAL
HCV AB SERPL QL IA: NORMAL
HIV 1+2 AB+HIV1 P24 AG SERPL QL IA: NORMAL
HSV1 IGG SER IA-ACNC: 15.4 INDEX
HSV2 IGG SER IA-ACNC: <0.9 INDEX
N GONORRHOEA RRNA SPEC QL NAA+PROBE: NOT DETECTED
RPR SER QL: NORMAL

## 2024-12-17 ENCOUNTER — RA CDI HCC (OUTPATIENT)
Dept: OTHER | Facility: HOSPITAL | Age: 49
End: 2024-12-17

## 2024-12-21 NOTE — ASSESSMENT & PLAN NOTE
It is controlled on buspirone.  He has had some anxiety about his health related to his neck symptoms.

## 2024-12-21 NOTE — PROGRESS NOTES
Adult Annual Physical  Name: Wil Anaya      : 1975      MRN: [id number]  Encounter Provider: Raul Ashton DO  Encounter Date: 2024   Encounter department: [facility] IN PARTNERSHIP WITH  [facility]    Assessment & Plan  Annual physical exam  I reviewed patient's recently done labs and everything looked good overall.  His cholesterol is well under control and no findings of concern were seen on lab work.  Orders:    POCT hemoglobin A1c    Encounter for vaccination  No vaccinations given.       Prostate cancer screening  I reviewed patient's recently done labs and everything looked good overall.  His cholesterol is well under control and no findings of concern were seen on lab work.       Primary hypertension  Blood pressure has been fluctuating.  It came down to 136/82 after rechecking.  Patient will continue to monitor at home and I will check this again in 2 months.  Orders:    POCT hemoglobin A1c    Obesity (BMI 30.0-34.9)  Continue with low-fat diet and regular exercise.    Orders:    POCT hemoglobin A1c    Anxiety  It is controlled on buspirone.  He has had some anxiety about his health related to his neck symptoms.       Cervical radiculopathy  Patient has symptoms of cervical radiculopathy on examination.  I will give him home exercises to do and he may use anti-inflammatories as needed.  Neck step to consider would be a muscle relaxer.  He will reach out if things are not getting better after 2 weeks of home therapy.       Uncomplicated asthma, unspecified asthma severity, unspecified whether persistent         Immunizations and preventive care screenings were discussed with patient today. Appropriate education was printed on patient's after visit summary.      Counseling:  Dental Health: discussed importance of regular tooth brushing, flossing, and dental visits.  Exercise: the importance of regular exercise/physical activity was discussed.  Recommend exercise 3-5 times per week for at least 30 minutes.       Depression Screening and Follow-up Plan: Patient was screened for depression during today's encounter. They screened negative with a PHQ-2 score of 0.        Patient was seen today for an annual physical exam. Age appropriate screening tests were done as above. Annual labs were ordered to be done through [employer]. Patient will be contacted regarding results and follow up will be based on findings. Patient was counseled on the importance of proper diet and exercise. I recommend diets rich in lean meats and leafy green vegetables. Try to have 150 minutes of exercise weekly at moderate intensity. See problem based charting for any chronic problems or new findings and current workup/management.    40 minutes were spent on chart review, examination, and plan of care. This note was dictated using software.     History of Present Illness     Adult Annual Physical:  Patient presents for annual physical.     Diet and Physical Activity:  - Diet/Nutrition:. mixed diet  - Exercise: walking and moderate cardiovascular exercise.    Depression Screening:  - PHQ-2 Score: 0    General Health:  - Sleep:. he has been up later than normal  - Hearing: normal hearing bilateral ears.  - Vision: no vision problems.  - Dental: regular dental visits.    Review of Systems   Constitutional:  Negative for fever.   Respiratory:  Negative for shortness of breath.    Cardiovascular:  Negative for chest pain.   Gastrointestinal:  Negative for abdominal pain, constipation and diarrhea.   Genitourinary:  Negative for difficulty urinating.   Musculoskeletal:  Positive for neck pain.   Skin:  Negative for rash.     Patient has been having some neck pain for the past couple weeks and has been seeing a chiropractor and massage therapist.  It is much better but he is continuing to have neck pain which has caused his working out to decrease.  He is also slipped up on his diet lately due  "to not being as active.    Objective   /82   Pulse 62   Temp 97.9 °F (36.6 °C) (Oral)   Ht 6' 3\" (1.905 m)   Wt 111 kg (245 lb)   SpO2 98%   BMI 30.62 kg/m²     Physical Exam  Constitutional:       General: He is not in acute distress.     Appearance: Normal appearance. He is obese.   HENT:      Head: Normocephalic and atraumatic.      Right Ear: Tympanic membrane, ear canal and external ear normal.      Left Ear: Tympanic membrane, ear canal and external ear normal.      Nose: Nose normal. No congestion.      Mouth/Throat:      Mouth: Mucous membranes are moist.      Pharynx: Oropharynx is clear. No oropharyngeal exudate or posterior oropharyngeal erythema.   Eyes:      Conjunctiva/sclera: Conjunctivae normal.   Cardiovascular:      Rate and Rhythm: Normal rate and regular rhythm.      Heart sounds: Normal heart sounds. No murmur heard.     No friction rub. No gallop.   Pulmonary:      Effort: Pulmonary effort is normal. No respiratory distress.      Breath sounds: Normal breath sounds. No wheezing.   Abdominal:      General: Abdomen is flat.      Palpations: Abdomen is soft.      Tenderness: There is no abdominal tenderness. There is no guarding.   Musculoskeletal:         General: Tenderness present.      Cervical back: Neck supple.      Comments: Muscular tension noted to left posterior shoulder/back   Lymphadenopathy:      Cervical: No cervical adenopathy.   Skin:     General: Skin is warm and dry.      Findings: No erythema or rash.   Neurological:      General: No focal deficit present.      Mental Status: He is alert and oriented to person, place, and time. Mental status is at baseline.   Psychiatric:         Mood and Affect: Mood normal.         Behavior: Behavior normal.         Thought Content: Thought content normal.         Judgment: Judgment normal.         "

## 2024-12-21 NOTE — ASSESSMENT & PLAN NOTE
Blood pressure has been fluctuating.  It came down to 136/82 after rechecking.  Patient will continue to monitor at home and I will check this again in 2 months.  Orders:    POCT hemoglobin A1c

## 2024-12-31 ENCOUNTER — OFFICE VISIT (OUTPATIENT)
Dept: FAMILY MEDICINE CLINIC | Facility: CLINIC | Age: 49
End: 2024-12-31

## 2024-12-31 VITALS
SYSTOLIC BLOOD PRESSURE: 136 MMHG | HEIGHT: 75 IN | OXYGEN SATURATION: 98 % | DIASTOLIC BLOOD PRESSURE: 82 MMHG | WEIGHT: 245 LBS | HEART RATE: 62 BPM | TEMPERATURE: 97.9 F | BODY MASS INDEX: 30.46 KG/M2

## 2024-12-31 DIAGNOSIS — M54.12 CERVICAL RADICULOPATHY: ICD-10-CM

## 2024-12-31 DIAGNOSIS — Z12.5 PROSTATE CANCER SCREENING: ICD-10-CM

## 2024-12-31 DIAGNOSIS — F41.9 ANXIETY: ICD-10-CM

## 2024-12-31 DIAGNOSIS — J45.909 UNCOMPLICATED ASTHMA, UNSPECIFIED ASTHMA SEVERITY, UNSPECIFIED WHETHER PERSISTENT: ICD-10-CM

## 2024-12-31 DIAGNOSIS — E66.811 OBESITY (BMI 30.0-34.9): ICD-10-CM

## 2024-12-31 DIAGNOSIS — I10 PRIMARY HYPERTENSION: ICD-10-CM

## 2024-12-31 DIAGNOSIS — Z23 ENCOUNTER FOR VACCINATION: ICD-10-CM

## 2024-12-31 DIAGNOSIS — Z00.00 ANNUAL PHYSICAL EXAM: Primary | ICD-10-CM

## 2024-12-31 LAB
ALBUMIN SERPL-MCNC: 4.3 G/DL (ref 3.6–5.1)
ALBUMIN/GLOB SERPL: 1.7 (CALC) (ref 1–2.5)
ALP SERPL-CCNC: 68 U/L (ref 36–130)
ALT SERPL-CCNC: 19 U/L (ref 9–46)
AST SERPL-CCNC: 18 U/L (ref 10–40)
BASOPHILS # BLD AUTO: 22 CELLS/UL (ref 0–200)
BASOPHILS NFR BLD AUTO: 0.4 %
BILIRUB SERPL-MCNC: 0.6 MG/DL (ref 0.2–1.2)
BUN SERPL-MCNC: 17 MG/DL (ref 7–25)
BUN/CREAT SERPL: ABNORMAL (CALC) (ref 6–22)
CALCIUM SERPL-MCNC: 9.2 MG/DL (ref 8.6–10.3)
CHLORIDE SERPL-SCNC: 102 MMOL/L (ref 98–110)
CHOLEST SERPL-MCNC: 149 MG/DL
CHOLEST/HDLC SERPL: 2.5 (CALC)
CO2 SERPL-SCNC: 31 MMOL/L (ref 20–32)
CREAT SERPL-MCNC: 0.8 MG/DL (ref 0.6–1.29)
EOSINOPHIL # BLD AUTO: 67 CELLS/UL (ref 15–500)
EOSINOPHIL NFR BLD AUTO: 1.2 %
ERYTHROCYTE [DISTWIDTH] IN BLOOD BY AUTOMATED COUNT: 12.7 % (ref 11–15)
GFR/BSA.PRED SERPLBLD CYS-BASED-ARV: 110 ML/MIN/1.73M2
GLOBULIN SER CALC-MCNC: 2.6 G/DL (CALC) (ref 1.9–3.7)
GLUCOSE SERPL-MCNC: 102 MG/DL (ref 65–99)
HCT VFR BLD AUTO: 43.9 % (ref 38.5–50)
HDLC SERPL-MCNC: 60 MG/DL
HGB BLD-MCNC: 14.7 G/DL (ref 13.2–17.1)
LDLC SERPL CALC-MCNC: 68 MG/DL (CALC)
LYMPHOCYTES # BLD AUTO: 1512 CELLS/UL (ref 850–3900)
LYMPHOCYTES NFR BLD AUTO: 27 %
MCH RBC QN AUTO: 30.4 PG (ref 27–33)
MCHC RBC AUTO-ENTMCNC: 33.5 G/DL (ref 32–36)
MCV RBC AUTO: 90.9 FL (ref 80–100)
MONOCYTES # BLD AUTO: 336 CELLS/UL (ref 200–950)
MONOCYTES NFR BLD AUTO: 6 %
NEUTROPHILS # BLD AUTO: 3662 CELLS/UL (ref 1500–7800)
NEUTROPHILS NFR BLD AUTO: 65.4 %
NONHDLC SERPL-MCNC: 89 MG/DL (CALC)
PLATELET # BLD AUTO: 193 THOUSAND/UL (ref 140–400)
PMV BLD REES-ECKER: 10.9 FL (ref 7.5–12.5)
POTASSIUM SERPL-SCNC: 3.5 MMOL/L (ref 3.5–5.3)
PROT SERPL-MCNC: 6.9 G/DL (ref 6.1–8.1)
PSA SERPL-MCNC: 0.58 NG/ML
RBC # BLD AUTO: 4.83 MILLION/UL (ref 4.2–5.8)
SL AMB POCT HEMOGLOBIN AIC: 5.4 (ref ?–6.5)
SODIUM SERPL-SCNC: 142 MMOL/L (ref 135–146)
TRIGL SERPL-MCNC: 128 MG/DL
WBC # BLD AUTO: 5.6 THOUSAND/UL (ref 3.8–10.8)

## 2024-12-31 PROCEDURE — 83036 HEMOGLOBIN GLYCOSYLATED A1C: CPT | Performed by: STUDENT IN AN ORGANIZED HEALTH CARE EDUCATION/TRAINING PROGRAM

## 2024-12-31 PROCEDURE — 99396 PREV VISIT EST AGE 40-64: CPT | Performed by: STUDENT IN AN ORGANIZED HEALTH CARE EDUCATION/TRAINING PROGRAM

## 2024-12-31 PROCEDURE — 99213 OFFICE O/P EST LOW 20 MIN: CPT | Performed by: STUDENT IN AN ORGANIZED HEALTH CARE EDUCATION/TRAINING PROGRAM

## 2025-02-24 NOTE — ASSESSMENT & PLAN NOTE
Patient continues to stay in stage I hypertension range.  Limit salt intake.  With further weight loss blood pressure will decrease over time.  Continue to monitor for now.

## 2025-02-24 NOTE — PROGRESS NOTES
Name: Wil Anaya      : 1975      MRN: [id number]  Encounter Provider: Raul Ashton DO  Encounter Date: 3/5/2025   Encounter department: [facility] IN PARTNERSHIP WITH [facility]  :  Assessment & Plan  Primary hypertension  Patient continues to stay in stage I hypertension range.  Limit salt intake.  With further weight loss blood pressure will decrease over time.  Continue to monitor for now.       Cervical radiculopathy  Patient continuing to have symptoms of cervical radiculopathy.  Will start patient on Flexeril 10 mg tablet at bedtime as needed for muscle spasms.  I recommended trying it out at night for several days and to continue stretches/anti-inflammatory medication.  If symptoms or not improving then patient was given an x-ray order for the cervical spine.  Will advise based on imaging.  Orders:    cyclobenzaprine (FLEXERIL) 10 mg tablet; Take 1 tablet (10 mg total) by mouth daily at bedtime as needed for muscle spasms    XR spine cervical 2 or 3 vw injury; Future    Uncomplicated asthma, unspecified asthma severity, unspecified whether persistent  Chronic and stable.  No concerns.       Anxiety  Chronic and stable.  No concerns today refill sent to patient's pharmacy on file..   Orders:    busPIRone (BUSPAR) 15 mg tablet; Take 1 tablet (15 mg total) by mouth 2 (two) times a day Taking BID        BMI Counseling: Body mass index is 30.25 kg/m². The BMI is above normal. Nutrition recommendations include encouraging healthy choices of fruits and vegetables. Exercise recommendations include moderate physical activity 150 minutes/week. Rationale for BMI follow-up plan is due to patient being overweight or obese.         Patient is a 50-year-old male who is presenting today for repeat blood pressure check and follow-up on cervical radiculopathy.  We will continue to monitor his blood pressure.  I will start him on Flexeril and have him continue home  "stretching.  If his symptoms do not get better with conservative care I will have him do an x-ray on the cervical spine.  Refill was given on buspirone today.  Will follow-up with patient for his annual in December or sooner as needed.    History of Present Illness   HPI  Review of Systems   Constitutional:  Negative for fever.   Respiratory:  Negative for shortness of breath.    Cardiovascular:  Negative for chest pain.   Gastrointestinal:  Negative for abdominal pain.   Musculoskeletal:  Positive for neck pain.   Skin:  Negative for rash.     Patient is a 50-year-old male who is presenting today for repeat blood pressure check.  Blood pressure continues to stay in the stage I hypertension range.  We will continue to monitor this.  As of right now he does not need to go back on medication.  He has been doing well trying to lose a little bit of weight.    He was also previously having symptoms of neck pain/cervical radiculopathy and was given home exercises to do.  He states that it had improved after doing home exercises but then came back and has been getting worse.  He has burning down the left side of the shoulder and neck.    He also needs a refill on his buspirone today.    Objective   /86   Pulse 60   Temp 98.1 °F (36.7 °C) (Temporal)   Resp 16   Ht 6' 3\" (1.905 m)   Wt 110 kg (242 lb)   SpO2 99%   BMI 30.25 kg/m²      Physical Exam  Constitutional:       General: He is not in acute distress.     Appearance: Normal appearance. He is obese.   HENT:      Head: Normocephalic and atraumatic.      Right Ear: External ear normal.      Left Ear: External ear normal.      Nose: Nose normal.      Mouth/Throat:      Mouth: Mucous membranes are moist.      Pharynx: Oropharynx is clear.   Eyes:      Conjunctiva/sclera: Conjunctivae normal.   Cardiovascular:      Rate and Rhythm: Normal rate and regular rhythm.      Heart sounds: Normal heart sounds. No murmur heard.     No friction rub. No gallop. "   Pulmonary:      Effort: Pulmonary effort is normal. No respiratory distress.      Breath sounds: Normal breath sounds. No wheezing.   Musculoskeletal:      Cervical back: Tenderness (Bilateral neck muscular tension.) present.   Skin:     General: Skin is warm and dry.      Findings: No erythema or rash.   Neurological:      General: No focal deficit present.      Mental Status: He is alert and oriented to person, place, and time. Mental status is at baseline.   Psychiatric:         Mood and Affect: Mood normal.         Behavior: Behavior normal.         Thought Content: Thought content normal.         Judgment: Judgment normal.       Administrative Statements   I have spent a total time of 20 minutes in caring for this patient on the day of the visit/encounter including Instructions for management, Documenting in the medical record, and Obtaining or reviewing history  .

## 2025-02-26 ENCOUNTER — RA CDI HCC (OUTPATIENT)
Dept: OTHER | Facility: HOSPITAL | Age: 50
End: 2025-02-26

## 2025-02-26 NOTE — PROGRESS NOTES
HCC coding opportunities       Chart reviewed, no opportunity found: CHART REVIEWED, NO OPPORTUNITY FOUND   This is a reminder to address (resolve/update/assess) ALL HCC (risk adjustment) codes as found on active problem list for 2025 as patient scores reset to zero ANNIE.     Patients Insurance        Commercial Insurance: [employer]

## 2025-03-05 ENCOUNTER — OFFICE VISIT (OUTPATIENT)
Dept: FAMILY MEDICINE CLINIC | Facility: CLINIC | Age: 50
End: 2025-03-05

## 2025-03-05 VITALS
BODY MASS INDEX: 30.09 KG/M2 | HEART RATE: 60 BPM | WEIGHT: 242 LBS | OXYGEN SATURATION: 99 % | RESPIRATION RATE: 16 BRPM | DIASTOLIC BLOOD PRESSURE: 86 MMHG | SYSTOLIC BLOOD PRESSURE: 132 MMHG | HEIGHT: 75 IN | TEMPERATURE: 98.1 F

## 2025-03-05 DIAGNOSIS — J45.909 UNCOMPLICATED ASTHMA, UNSPECIFIED ASTHMA SEVERITY, UNSPECIFIED WHETHER PERSISTENT: ICD-10-CM

## 2025-03-05 DIAGNOSIS — F41.9 ANXIETY: ICD-10-CM

## 2025-03-05 DIAGNOSIS — M54.12 CERVICAL RADICULOPATHY: ICD-10-CM

## 2025-03-05 DIAGNOSIS — I10 PRIMARY HYPERTENSION: Primary | ICD-10-CM

## 2025-03-05 PROCEDURE — 99213 OFFICE O/P EST LOW 20 MIN: CPT | Performed by: STUDENT IN AN ORGANIZED HEALTH CARE EDUCATION/TRAINING PROGRAM

## 2025-03-05 PROCEDURE — FLEXERIL 10MG 30 FLEXERIL 10MG 30: Performed by: STUDENT IN AN ORGANIZED HEALTH CARE EDUCATION/TRAINING PROGRAM

## 2025-03-05 RX ORDER — BUSPIRONE HYDROCHLORIDE 15 MG/1
15 TABLET ORAL 2 TIMES DAILY
Qty: 180 TABLET | Refills: 3 | Status: SHIPPED | OUTPATIENT
Start: 2025-03-05

## 2025-03-05 RX ORDER — CYCLOBENZAPRINE HCL 10 MG
10 TABLET ORAL
Qty: 30 TABLET | Refills: 0
Start: 2025-03-05

## 2025-03-05 NOTE — ASSESSMENT & PLAN NOTE
Chronic and stable.  No concerns today refill sent to patient's pharmacy on file..   Orders:    busPIRone (BUSPAR) 15 mg tablet; Take 1 tablet (15 mg total) by mouth 2 (two) times a day Taking BID

## 2025-03-11 ENCOUNTER — APPOINTMENT (OUTPATIENT)
Dept: RADIOLOGY | Facility: CLINIC | Age: 50
End: 2025-03-11
Payer: COMMERCIAL

## 2025-03-11 DIAGNOSIS — M54.12 CERVICAL RADICULOPATHY: ICD-10-CM

## 2025-03-11 PROCEDURE — 72040 X-RAY EXAM NECK SPINE 2-3 VW: CPT

## 2025-03-12 ENCOUNTER — RESULTS FOLLOW-UP (OUTPATIENT)
Dept: FAMILY MEDICINE CLINIC | Facility: CLINIC | Age: 50
End: 2025-03-12

## 2025-03-12 PROBLEM — M54.12 CERVICAL RADICULOPATHY: Status: ACTIVE | Noted: 2025-03-12

## 2025-03-18 DIAGNOSIS — M54.12 CERVICAL RADICULOPATHY: Primary | ICD-10-CM

## 2025-03-20 DIAGNOSIS — M54.12 CERVICAL RADICULOPATHY: Primary | ICD-10-CM

## 2025-03-24 DIAGNOSIS — Z11.3 SCREENING FOR STD (SEXUALLY TRANSMITTED DISEASE): Primary | ICD-10-CM

## 2025-03-27 ENCOUNTER — RESULTS FOLLOW-UP (OUTPATIENT)
Dept: FAMILY MEDICINE CLINIC | Facility: CLINIC | Age: 50
End: 2025-03-27

## 2025-03-30 LAB
C TRACH RRNA SPEC QL NAA+PROBE: NOT DETECTED
HAV IGM SERPL QL IA: NORMAL
HBV CORE IGM SERPL QL IA: NORMAL
HBV SURFACE AG SERPL QL IA: NORMAL
HCV AB SERPL QL IA: NORMAL
HIV 1+2 AB+HIV1 P24 AG SERPL QL IA: NORMAL
HSV1 DNA SPEC QL NAA+PROBE: NOT DETECTED
HSV2 DNA SPEC QL NAA+PROBE: NOT DETECTED
N GONORRHOEA RRNA SPEC QL NAA+PROBE: NOT DETECTED
RPR SER QL: NORMAL
SPECIMEN SOURCE: NORMAL

## 2025-04-11 ENCOUNTER — CONSULT (OUTPATIENT)
Dept: PAIN MEDICINE | Facility: CLINIC | Age: 50
End: 2025-04-11
Payer: COMMERCIAL

## 2025-04-11 VITALS — WEIGHT: 245 LBS | HEIGHT: 75 IN | BODY MASS INDEX: 30.46 KG/M2

## 2025-04-11 DIAGNOSIS — M47.22 OTHER SPONDYLOSIS WITH RADICULOPATHY, CERVICAL REGION: ICD-10-CM

## 2025-04-11 DIAGNOSIS — M54.12 CERVICAL RADICULOPATHY: Primary | ICD-10-CM

## 2025-04-11 PROCEDURE — 99244 OFF/OP CNSLTJ NEW/EST MOD 40: CPT | Performed by: ANESTHESIOLOGY

## 2025-04-11 RX ORDER — GABAPENTIN 300 MG/1
300 CAPSULE ORAL 3 TIMES DAILY
Qty: 90 CAPSULE | Refills: 2 | Status: SHIPPED | OUTPATIENT
Start: 2025-04-11 | End: 2025-04-11

## 2025-04-11 NOTE — PATIENT INSTRUCTIONS
Patient Education     Neck Pain Exercises   About this topic   The neck or cervical spine has 7 spinal bones that run from the base of your skull to the upper back. These spinal bones have discs in between them. Discs act as shock absorbers. Ligaments are strong bands of tissue that hold the bones together. Many muscles surround and attach on these bones. Nerves come off of the spinal cord and exit out of small spaces in between the spinal bones. You can have neck pain if any of these are injured or damaged. Exercises may help to make this problem better.  General   Before starting with a program, ask your doctor if you are healthy enough to do these exercises. Your doctor may have you work with a  or physical therapist to make a safe exercise program to meet your needs. You should not do the exercises if they cause sharp pains, if you feel dizzy, or if you have vision changes.  Stretching Exercises   Stretching exercises keep your muscles flexible. They also stop them from getting tight. Start by doing each of these stretches 2 to 3 times. In order for your body to make changes, you will need to hold these stretches for 20 to 30 seconds. Try to do the stretches 2 to 3 times each day. Do all exercises slowly.  Passive neck stretches:  Put your left hand on top of your head. Your other arm can be at your side or behind your back. Pull your head toward your left shoulder until you feel a gentle stretch on the right side of your neck. Repeat on the other side using your other hand.  Also, try this stretch by pulling in a diagonal direction. With your left hand on top of your head, pull your head down towards the direction of your left knee. You should feel this stretch toward the back on the right side of your neck. Repeat on the other side.  Active neck stretches:  Neck front-to-back motion ? Look down to the floor until you feel a stretch in the back of your neck. Hold. Next, look up to the ceiling until you  feel a stretch in the front of your neck. Hold.  Neck side-to-side motion ? Tilt your head to the side and bring your ear to your shoulder until you feel a stretch on the other side of your neck. Hold. Next, tilt your head to the other side until you feel a stretch. Hold.  Neck turning ? Turn only your head and look over your left shoulder until you feel stretching in the right side of your neck. Hold. Now turn only your head and look over your right shoulder until you feel a stretch in the left side of your neck. Hold.  Scalene stretches ? Grasp your head with the hand opposite the side you want to stretch. Pull your head to the side until you feel a stretch. Now, slowly turn your head so your chin is pointed upwards.  Chin tucks ? Stand straight or lie down on your back. Tuck your chin in and lengthen the back of your neck. Return to the starting position and repeat. It may help to stand up against a wall during this exercise. Try gently pushing your chin with two fingers while trying to flatten your neck against the wall. If you do this exercise lying down, try using a small rolled up washcloth under your neck. Push down into the washcloth when tucking in your chin.  Strengthening Exercises   Strengthening exercises keep your muscles firm and strong. Start by repeating each exercise 2 to 3 times. Work up to doing each exercise 10 times. Try to do the exercises 2 to 3 times each day. Hold each exercise for 3 to 5 seconds. Do all exercises slowly.  Shoulder blade squeezes ? Pinch your shoulder blades together on your upper back and hold 3 to 5 seconds. Relax.             What will the results be?   Less pain and stiffness  Better range of motion  Increased strength  Help you heal faster after an injury or surgery  Increase blood flow to a body part  Help you feel better and more relaxed  Give you more energy  More toned looking muscles  Better posture  Easier to do daily activities  Helpful tips   Stay active and  work out to keep your muscles strong and flexible.  Be sure you do not hold your breath when exercising. This can raise your blood pressure. If you tend to hold your breath, try counting out loud when exercising. If any exercise bothers you, stop right away.  Try swinging your arms at an easy pace for a few minutes to warm up your muscles. Do this again after exercising.  Doing exercises before a meal may be a good way to get into a routine.  Exercise may be slightly uncomfortable, but you should not have sharp pains. If you do get sharp pains, stop what you are doing. If the sharp pains continue, call your doctor.  Last Reviewed Date   2020-03-10  Consumer Information Use and Disclaimer   This generalized information is a limited summary of diagnosis, treatment, and/or medication information. It is not meant to be comprehensive and should be used as a tool to help the user understand and/or assess potential diagnostic and treatment options. It does NOT include all information about conditions, treatments, medications, side effects, or risks that may apply to a specific patient. It is not intended to be medical advice or a substitute for the medical advice, diagnosis, or treatment of a health care provider based on the health care provider's examination and assessment of a patient’s specific and unique circumstances. Patients must speak with a health care provider for complete information about their health, medical questions, and treatment options, including any risks or benefits regarding use of medications. This information does not endorse any treatments or medications as safe, effective, or approved for treating a specific patient. UpToDate, Inc. and its affiliates disclaim any warranty or liability relating to this information or the use thereof. The use of this information is governed by the Terms of Use, available at https://www.[employer]iv[employer]uwer.com/en/know/clinical-effectiveness-terms   Copyright   Copyright ©  2024 [employer]. and its affiliates and/or licensors. All rights reserved.  Patient Education     Gabapentin (GA ba pen tin)   Brand Names: [place] Gralise; Neurontin   Brand Names: [employer]-Gabapentin; APO-Gabapentin; Auro-Gabapentin; BIO-Gabapentin [DSC]; DOM-Gabapentin; GD-Gabapentin [DSC]; GLN-Gabapentin; JAMP-Gabapentin; Mar-Gabapentin [DSC]; MINT-Gabapentin; Neurontin; PMS-Gabapentin; Priva-Gabapentin [DSC]; PRO-Gabapentin; RAN-Gabapentin [DSC]; JOSH-Gabapentin; TARO-Gabapentin [DSC]; TEVA-Gabapentin   What is this drug used for?   It is used to treat painful nerve diseases.  It is used to help control certain kinds of seizures.  It may be given to you for other reasons. Talk with the doctor.  What do I need to tell my doctor BEFORE I take this drug?   If you are allergic to this drug; any part of this drug; or any other drugs, foods, or substances. Tell your doctor about the allergy and what signs you had.  If you have kidney disease or are on dialysis.  This is not a list of all drugs or health problems that interact with this drug.  Tell your doctor and pharmacist about all of your drugs (prescription or OTC, natural products, vitamins) and health problems. You must check to make sure that it is safe for you to take this drug with all of your drugs and health problems. Do not start, stop, or change the dose of any drug without checking with your doctor.  What are some things I need to know or do while I take this drug?   For all uses of this drug:   Tell all of your health care providers that you take this drug. This includes your doctors, nurses, pharmacists, and dentists.  Avoid driving and doing other tasks or actions that call for you to be alert until you see how this drug affects you.  This drug may affect certain lab tests. Tell all of your health care providers and lab workers that you take this drug.  Have blood work checked as you have been told by the doctor. Talk with the doctor.  Talk with your  doctor before you use alcohol, marijuana or other forms of cannabis, or prescription or OTC drugs that may slow your actions.  This drug is not the same as gabapentin enacarbil (Horizant). Do not use in its place. Talk with the doctor.  Do not stop taking this drug all of a sudden without calling your doctor. You may have a greater risk of side effects. If you need to stop this drug, you will want to slowly stop it as ordered by your doctor.  A severe and sometimes deadly reaction has happened. Most of the time, this reaction has signs like fever, rash, or swollen glands with problems in body organs like the liver, kidney, blood, heart, muscles and joints, or lungs. If you have questions, talk with the doctor.  Severe breathing problems have happened with this drug in people taking certain other drugs (like opioid pain drugs). This has also happened in people who already have lung or breathing problems. The risk may also be greater in people who are older than 65. Sometimes, breathing problems have been deadly. If you have questions, talk with the doctor.  If you are 65 or older, use this drug with care. You could have more side effects.  If the patient is 3 to 12 years of age, use this drug with care. The risk of mood or behavior problems may be higher in these children.  Tell your doctor if you are pregnant, plan on getting pregnant, or are breast-feeding. You will need to talk about the benefits and risks to you and the baby.  For seizures:   If seizures are different or worse after starting this drug, talk with the doctor.  What are some side effects that I need to call my doctor about right away?   WARNING/CAUTION: Even though it may be rare, some people may have very bad and sometimes deadly side effects when taking a drug. Tell your doctor or get medical help right away if you have any of the following signs or symptoms that may be related to a very bad side effect:  Signs of an allergic reaction, like rash;  hives; itching; red, swollen, blistered, or peeling skin with or without fever; wheezing; tightness in the chest or throat; trouble breathing, swallowing, or talking; unusual hoarseness; or swelling of the mouth, face, lips, tongue, or throat.  Signs of liver problems like dark urine, tiredness, decreased appetite, upset stomach or stomach pain, light-colored stools, throwing up, or yellow skin or eyes.  Signs of kidney problems like unable to pass urine, change in how much urine is passed, blood in the urine, or a big weight gain.  Trouble controlling body movements, twitching, change in balance, trouble swallowing or speaking.  Memory problems or loss.  Change in eyesight.  Not able to control eye movements.  Feeling confused, not able to focus, or change in behavior.  Shakiness.  Trouble breathing, slow breathing, or shallow breathing.  Blue or gray color of the skin, lips, nail beds, fingers, or toes.  Swelling in the arms or legs.  Severe dizziness or passing out.  Fever, chills, or sore throat; any unexplained bruising or bleeding; or feeling very tired or weak.  Muscle pain or weakness.  Get medical help right away if you feel very sleepy, very dizzy, or if you pass out. Caregivers or others need to get medical help right away if the patient does not respond, does not answer or react like normal, or will not wake up.  Like other drugs that may be used for seizures, this drug may rarely raise the risk of suicidal thoughts or actions. The risk may be higher in people who have had suicidal thoughts or actions in the past. Call the doctor right away about any new or worse signs like depression; feeling nervous, restless, or grouchy; panic attacks; or other changes in mood or behavior. Call the doctor right away if any suicidal thoughts or actions occur.  What are some other side effects of this drug?   All drugs may cause side effects. However, many people have no side effects or only have minor side effects.  Call your doctor or get medical help if any of these side effects or any other side effects bother you or do not go away:  Feeling dizzy, sleepy, tired, or weak.  Diarrhea, upset stomach, or throwing up.  Dry mouth.  These are not all of the side effects that may occur. If you have questions about side effects, call your doctor. Call your doctor for medical advice about side effects.  You may report side effects to your national health agency.  You may report side effects to the FDA at 1-577.674.1540. You may also report side effects at https://www.fda.gov/medwatch.  How is this drug best taken?   Use this drug as ordered by your doctor. Read all information given to you. Follow all instructions closely.  All products:   Keep taking this drug as you have been told by your doctor or other health care provider, even if you feel well.  If you are taking an antacid that has aluminum or magnesium in it, take this drug at least 2 hours after taking the antacid.  Gralise:   Take with the evening meal.  Swallow whole. Do not chew, break, or crush.  All other products:   Take with or without food.  Capsules:   Swallow whole with a full glass of water.  Tablets:   You may break the tablet in half. Do not chew or crush.  If you break the tablet in half, use the other half of the tablet for the next dose, as told by the doctor. Throw away half-tablets not used within 28 days.  Liquid (solution):   Measure liquid doses carefully. Use the measuring device that comes with this drug. If there is none, ask the pharmacist for a device to measure this drug.  What do I do if I miss a dose?   Take a missed dose as soon as you think about it.  If it is close to the time for your next dose, skip the missed dose and go back to your normal time.  Do not take 2 doses at the same time or extra doses.  How do I store and/or throw out this drug?   Liquid (solution):   Store in a refrigerator. Do not freeze.  All other products:   Store at room  temperature in a dry place. Do not store in a bathroom.  All dose forms:   Keep all drugs in a safe place. Keep all drugs out of the reach of children and pets.  Throw away unused or  drugs. Do not flush down a toilet or pour down a drain unless you are told to do so. Check with your pharmacist if you have questions about the best way to throw out drugs. There may be drug take-back programs in your area.  General drug facts   If your symptoms or health problems do not get better or if they become worse, call your doctor.  Do not share your drugs with others and do not take anyone else's drugs.  Some drugs may have another patient information leaflet. If you have any questions about this drug, please talk with your doctor, nurse, pharmacist, or other health care provider.  Some drugs may have another patient information leaflet. Check with your pharmacist. If you have any questions about this drug, please talk with your doctor, nurse, pharmacist, or other health care provider.  If you think there has been an overdose, call your poison control center or get medical care right away. Be ready to tell or show what was taken, how much, and when it happened.  Consumer Information Use and Disclaimer   This generalized information is a limited summary of diagnosis, treatment, and/or medication information. It is not meant to be comprehensive and should be used as a tool to help the user understand and/or assess potential diagnostic and treatment options. It does NOT include all information about conditions, treatments, medications, side effects, or risks that may apply to a specific patient. It is not intended to be medical advice or a substitute for the medical advice, diagnosis, or treatment of a health care provider based on the health care provider's examination and assessment of a patient's specific and unique circumstances. Patients must speak with a health care provider for complete information about their  health, medical questions, and treatment options, including any risks or benefits regarding use of medications. This information does not endorse any treatments or medications as safe, effective, or approved for treating a specific patient. UpToDate, Inc. and its affiliates disclaim any warranty or liability relating to this information or the use thereof. The use of this information is governed by the Terms of Use, available at https://www.woltersDelve Networksuwer.com/en/know/clinical-effectiveness-terms.  Last Reviewed Date   2023-05-09  Copyright   © 2024 UpToDate, Inc. and its affiliates and/or licensors. All rights reserved.

## 2025-04-11 NOTE — PROGRESS NOTES
Assessment  1. Cervical radiculopathy  -     Ambulatory referral to Spine & Pain Management  2. Other spondylosis with radiculopathy, cervical region  -     Ambulatory referral to Physical Therapy; Future    Left sided cervical radicular pain in C5 and C6 dermatomal distribution accompanied by pain limited weakness numbness and paresthesias.  The patient has not yet been a full participant with physical therapy. Subacute pain with decreased participation with IADLs over the past few months.  Has been taking NSAIDs and tylenol infrequently with modest benefit.  5/5 strength bilaterally in upper extremities with AROM, positive spurling's maneuver, left sided.  Additionally there is positive cervical facet loading eliciting pain, left greater than right. Negative Hendrix's, denies any gait instability, saddle anesthesia. On xray imaging mild intervertebral disc space narrowing at C4/C5 and C5/C6. There are small anterior vertebral osteophytes at the C5 and C6. There is mild multilevel bilateral facet arthropathy.  Risks, benefits alternatives to epidural steroid injections thoroughly discussed with patient.  Handouts provided questions answered to patient's satisfaction.  Lifestyle modifications extensively discussed including sleep and neck posture, diet, exercise and weight loss in conjunction with PT.  Will proceed with multimodal pain therapy plan as noted below:    Plan  -f/u after 6 weeks of home PT plan for cervical spine pain  -gabapentin discussed; handouts provided; counseled regarding sedative effects of taking this medication and provided up titration calendar.  Counseled not to take medication while driving or operating heavy machinery/using stairs  -Meloxicam 7.5 mg b.i.d. prn pain prescribed.  Patient educated regarding bleeding risk of taking this medication as well as not taking any other nonsteroidal anti-inflammatory medications while taking this medication; counseled thoroughly regarding  potential risk of Cardiovascular injury, Kidney injury, Gastrointestinal ulceration/bleeding. Patient voiced understanding  -home physical therapy for cervical radiculopathy provided; Physician directed home exercise plan as per [employer] demonstrated and handouts provided that patient plans to participate with for 1 hour, twice a week for the next 6 weeks.     There are risks associated with opioid medications, including dependence, addiction and tolerance. The patient understands and agrees to use these medications only as prescribed. Potential side effects of the medications include, but are not limited to, constipation, drowsiness, addiction, impaired judgment and risk of fatal overdose if not taken as prescribed. The patient was warned against driving while taking sedation medications or operating heavy machinery. The patient voiced understanding. Sharing medications is a felony. At this point in time, the patient is showing no signs of addiction, abuse, diversion or suicidal ideation.     [place] Prescription Drug Monitoring Program report was reviewed and was appropriate      Complete risks and benefits including bleeding, infection, tissue reaction, nerve injury and allergic reaction were discussed. The approach was demonstrated using models and literature was provided. Verbal and written consent was obtained.     My impressions and treatment recommendations were discussed in detail with the patient who verbalized understanding and had no further questions.  Discharge instructions were provided. I personally saw and examined the patient and I agree with the above discussed plan of care.    Review of external notes including PT notes, PCP notes and specialist notes was performed at this visit in addition to review of new ordered imaging and past imaging to develop or modify multidisciplinary pain plan    No orders of the defined types were placed in this encounter.      History of Present Illness    Wil  Jaclyn is a 50 y.o. male with pmhx of HTN, anxiety presenting with subacute left-sided neck pain described primarily as radicular nature.  The pain radiates in the C5 and C6 dermatomal distributions and is primarily left-sided.  The pain contributes to significant disability in participation with independent activities of daily living and is accompanied by weakness numbness and paresthesias that are debilitating in nature.  The patient describes that overhead maneuvers such as combing hair is significantly limiting with respect to strength in the left arm/hand. The patient has not been to physical therapy.  He has trialed conservative measures including nsaids, tylenol for the pain but has not trialed any steroids. He has never had interventional pain procedures in the past including any cervical interlaminar epidural steroid injections in the past for this pain. Denies any gait abnormality, saddle anesthesia or bowel/bladder abnormality.    I have personally reviewed and/or updated the patient's past medical history, past surgical history, family history, social history, current medications, allergies, and vital signs today.     Review of Systems   Constitutional:  Positive for activity change.   HENT: Negative.     Eyes: Negative.    Respiratory: Negative.     Cardiovascular: Negative.    Gastrointestinal: Negative.    Endocrine: Negative.    Genitourinary: Negative.    Musculoskeletal:  Positive for arthralgias, myalgias, neck pain and neck stiffness. Negative for back pain and gait problem.   Skin: Negative.    Allergic/Immunologic: Negative.    Neurological:  Positive for weakness and numbness.   Hematological: Negative.    Psychiatric/Behavioral: Negative.     All other systems reviewed and are negative.      Patient Active Problem List   Diagnosis    Xiphodynia    Anxiety    Asthma    High blood pressure    Cervical radiculopathy    Other spondylosis with radiculopathy, cervical region       Past Medical  "History:   Diagnosis Date    Anxiety     Asthma     Hypertension        Past Surgical History:   Procedure Laterality Date    KNEE SURGERY         Family History   Problem Relation Age of Onset    Alcohol abuse Father     Diabetes Maternal Grandmother     Breast cancer Sister        Social History     Occupational History    Not on file   Tobacco Use    Smoking status: Former     Current packs/day: 0.25     Average packs/day: 0.3 packs/day for 10.0 years (2.5 ttl pk-yrs)     Types: Cigarettes    Smokeless tobacco: Never   Vaping Use    Vaping status: Never Used   Substance and Sexual Activity    Alcohol use: Never    Drug use: Never    Sexual activity: Yes     Partners: Female     Birth control/protection: OCP       Current Outpatient Medications on File Prior to Visit   Medication Sig    busPIRone (BUSPAR) 15 mg tablet Take 1 tablet (15 mg total) by mouth 2 (two) times a day Taking BID    Multiple Vitamins-Minerals (Multi Adult Gummies) CHEW     [DISCONTINUED] Albuterol-Budesonide 90-80 MCG/ACT AERO  (Patient not taking: Reported on 4/11/2025)    [DISCONTINUED] cyclobenzaprine (FLEXERIL) 10 mg tablet Take 1 tablet (10 mg total) by mouth daily at bedtime as needed for muscle spasms (Patient not taking: Reported on 4/11/2025)    [DISCONTINUED] fluticasone (Flovent HFA) 220 mcg/act inhaler  (Patient not taking: Reported on 4/11/2025)    [DISCONTINUED] omeprazole (PriLOSEC) 20 mg delayed release capsule  (Patient not taking: Reported on 4/11/2025)     No current facility-administered medications on file prior to visit.       No Known Allergies      Physical Exam    Ht 6' 3\" (1.905 m)   Wt 111 kg (245 lb)   BMI 30.62 kg/m²     Constitutional: normal, well developed, well nourished, alert, in no distress and non-toxic and no overt pain behavior.  Eyes: anicteric  HEENT: grossly intact  Neck: supple, symmetric, trachea midline and no masses   Pulmonary:even and unlabored  Cardiovascular:No edema or pitting edema " present  Skin:Normal without rashes or lesions and well hydrated  Psychiatric:Mood and affect appropriate  Neurologic:Cranial Nerves II-XII grossly intact Sensation grossly intact; no clonus negative parker's. Reflexes 2+ and brisk. Spurling's maneuver positive left sided  Musculoskeletal:normal gait. 5/5 strength bilaterally with AROM in upper extremities. Significant pain with cervical facet loading bilaterally and with lateral spine rotation, ttp over cervical paraspinal muscles left sided    Imaging    XR SPINE CERVICAL 2 OR 3 VW INJURY     INDICATION: M54.12: Radiculopathy, cervical region.     COMPARISON: None     FINDINGS:     No acute fracture or subluxation.     Anatomic alignment.     There is mild intervertebral disc space narrowing at C4/C5 and C5/C6. There are small anterior vertebral osteophytes at the C5 and C6. There is mild multilevel bilateral facet arthropathy.     Normal prevertebral soft tissues.     Clear lung apices.     IMPRESSION:     No acute osseous abnormality.     Degenerative changes as described.

## 2025-05-12 ENCOUNTER — TELEMEDICINE (OUTPATIENT)
Dept: PAIN MEDICINE | Facility: CLINIC | Age: 50
End: 2025-05-12
Payer: COMMERCIAL

## 2025-05-12 VITALS — HEIGHT: 75 IN | WEIGHT: 245 LBS | BODY MASS INDEX: 30.46 KG/M2

## 2025-05-12 DIAGNOSIS — M47.22 OTHER SPONDYLOSIS WITH RADICULOPATHY, CERVICAL REGION: Primary | ICD-10-CM

## 2025-05-12 PROCEDURE — 99214 OFFICE O/P EST MOD 30 MIN: CPT | Performed by: ANESTHESIOLOGY

## 2025-05-12 NOTE — PATIENT INSTRUCTIONS
Patient Education     Neck Pain Exercises   About this topic   The neck or cervical spine has 7 spinal bones that run from the base of your skull to the upper back. These spinal bones have discs in between them. Discs act as shock absorbers. Ligaments are strong bands of tissue that hold the bones together. Many muscles surround and attach on these bones. Nerves come off of the spinal cord and exit out of small spaces in between the spinal bones. You can have neck pain if any of these are injured or damaged. Exercises may help to make this problem better.  General   Before starting with a program, ask your doctor if you are healthy enough to do these exercises. Your doctor may have you work with a  or physical therapist to make a safe exercise program to meet your needs. You should not do the exercises if they cause sharp pains, if you feel dizzy, or if you have vision changes.  Stretching Exercises   Stretching exercises keep your muscles flexible. They also stop them from getting tight. Start by doing each of these stretches 2 to 3 times. In order for your body to make changes, you will need to hold these stretches for 20 to 30 seconds. Try to do the stretches 2 to 3 times each day. Do all exercises slowly.  Passive neck stretches:  Put your left hand on top of your head. Your other arm can be at your side or behind your back. Pull your head toward your left shoulder until you feel a gentle stretch on the right side of your neck. Repeat on the other side using your other hand.  Also, try this stretch by pulling in a diagonal direction. With your left hand on top of your head, pull your head down towards the direction of your left knee. You should feel this stretch toward the back on the right side of your neck. Repeat on the other side.  Active neck stretches:  Neck front-to-back motion ? Look down to the floor until you feel a stretch in the back of your neck. Hold. Next, look up to the ceiling until you  feel a stretch in the front of your neck. Hold.  Neck side-to-side motion ? Tilt your head to the side and bring your ear to your shoulder until you feel a stretch on the other side of your neck. Hold. Next, tilt your head to the other side until you feel a stretch. Hold.  Neck turning ? Turn only your head and look over your left shoulder until you feel stretching in the right side of your neck. Hold. Now turn only your head and look over your right shoulder until you feel a stretch in the left side of your neck. Hold.  Scalene stretches ? Grasp your head with the hand opposite the side you want to stretch. Pull your head to the side until you feel a stretch. Now, slowly turn your head so your chin is pointed upwards.  Chin tucks ? Stand straight or lie down on your back. Tuck your chin in and lengthen the back of your neck. Return to the starting position and repeat. It may help to stand up against a wall during this exercise. Try gently pushing your chin with two fingers while trying to flatten your neck against the wall. If you do this exercise lying down, try using a small rolled up washcloth under your neck. Push down into the washcloth when tucking in your chin.  Strengthening Exercises   Strengthening exercises keep your muscles firm and strong. Start by repeating each exercise 2 to 3 times. Work up to doing each exercise 10 times. Try to do the exercises 2 to 3 times each day. Hold each exercise for 3 to 5 seconds. Do all exercises slowly.  Shoulder blade squeezes ? Pinch your shoulder blades together on your upper back and hold 3 to 5 seconds. Relax.             What will the results be?   Less pain and stiffness  Better range of motion  Increased strength  Help you heal faster after an injury or surgery  Increase blood flow to a body part  Help you feel better and more relaxed  Give you more energy  More toned looking muscles  Better posture  Easier to do daily activities  Helpful tips   Stay active and  work out to keep your muscles strong and flexible.  Be sure you do not hold your breath when exercising. This can raise your blood pressure. If you tend to hold your breath, try counting out loud when exercising. If any exercise bothers you, stop right away.  Try swinging your arms at an easy pace for a few minutes to warm up your muscles. Do this again after exercising.  Doing exercises before a meal may be a good way to get into a routine.  Exercise may be slightly uncomfortable, but you should not have sharp pains. If you do get sharp pains, stop what you are doing. If the sharp pains continue, call your doctor.  Last Reviewed Date   2020-03-10  Consumer Information Use and Disclaimer   This generalized information is a limited summary of diagnosis, treatment, and/or medication information. It is not meant to be comprehensive and should be used as a tool to help the user understand and/or assess potential diagnostic and treatment options. It does NOT include all information about conditions, treatments, medications, side effects, or risks that may apply to a specific patient. It is not intended to be medical advice or a substitute for the medical advice, diagnosis, or treatment of a health care provider based on the health care provider's examination and assessment of a patient’s specific and unique circumstances. Patients must speak with a health care provider for complete information about their health, medical questions, and treatment options, including any risks or benefits regarding use of medications. This information does not endorse any treatments or medications as safe, effective, or approved for treating a specific patient. [employer] and its affiliates disclaim any warranty or liability relating to this information or the use thereof. The use of this information is governed by the Terms of Use, available at https://www.woltersbaixing.comuwer.com/en/know/clinical-effectiveness-terms   Copyright   Copyright ©  2024 [employer] and its affiliates and/or licensors. All rights reserved.

## 2025-05-12 NOTE — PROGRESS NOTES
Assessment  1. Other spondylosis with radiculopathy, cervical region  -     MRI cervical spine without contrast; Future; Expected date: 05/12/2025    Left sided cervical radicular pain in C5 and C6 dermatomal distribution accompanied by pain limited weakness numbness and paresthesias.  The patient has completed home physical therapy with only modest benefit. Subacute pain with decreased participation with IADLs over the past few months.  Has been taking NSAIDs and tylenol infrequently with modest benefit.  5/5 strength bilaterally in upper extremities with AROM, positive spurling's maneuver, left sided.  Additionally there is positive cervical facet loading eliciting pain, left greater than right. Negative Hendrix's, denies any gait instability, saddle anesthesia. On xray imaging mild intervertebral disc space narrowing at C4/C5 and C5/C6. There are small anterior vertebral osteophytes at the C5 and C6. There is mild multilevel bilateral facet arthropathy.  Risks, benefits alternatives to epidural steroid injections thoroughly discussed with patient.  Handouts provided questions answered to patient's satisfaction.  Lifestyle modifications extensively discussed including sleep and neck posture, diet, exercise and weight loss in conjunction with PT.  Will proceed with multimodal pain therapy plan as noted below:    Plan  -MRI cervical spine; f/u result with patient  -gabapentin discussed; handouts provided; counseled regarding sedative effects of taking this medication and provided up titration calendar.  Counseled not to take medication while driving or operating heavy machinery/using stairs  -Meloxicam 7.5 mg b.i.d. prn pain prescribed.  Patient educated regarding bleeding risk of taking this medication as well as not taking any other nonsteroidal anti-inflammatory medications while taking this medication; counseled thoroughly regarding potential risk of Cardiovascular injury, Kidney injury, Gastrointestinal  ulceration/bleeding. Patient voiced understanding  -has completed home physical therapy for cervical radiculopathy ; Physician directed home exercise plan as per AAOS demonstrated and handouts provided that patient has participated with for 1 hour, twice a week for the past 6 weeks.     There are risks associated with opioid medications, including dependence, addiction and tolerance. The patient understands and agrees to use these medications only as prescribed. Potential side effects of the medications include, but are not limited to, constipation, drowsiness, addiction, impaired judgment and risk of fatal overdose if not taken as prescribed. The patient was warned against driving while taking sedation medications or operating heavy machinery. The patient voiced understanding. Sharing medications is a felony. At this point in time, the patient is showing no signs of addiction, abuse, diversion or suicidal ideation.     [place] Prescription Drug Monitoring Program report was reviewed and was appropriate      Complete risks and benefits including bleeding, infection, tissue reaction, nerve injury and allergic reaction were discussed. The approach was demonstrated using models and literature was provided. Verbal and written consent was obtained.     My impressions and treatment recommendations were discussed in detail with the patient who verbalized understanding and had no further questions.  Discharge instructions were provided. I personally saw and examined the patient and I agree with the above discussed plan of care.    Review of external notes including PT notes, PCP notes and specialist notes was performed at this visit in addition to review of new ordered imaging and past imaging to develop or modify multidisciplinary pain plan    No orders of the defined types were placed in this encounter.      History of Present Illness    Wil Anaya is a 50 y.o. male with pmhx of HTN, anxiety presenting with  subacute left-sided neck pain described primarily as radicular nature.  The pain radiates in the C5 and C6 dermatomal distributions and is primarily left-sided.  The pain contributes to significant disability in participation with independent activities of daily living and is accompanied by weakness numbness and paresthesias that are debilitating in nature.  The patient describes that overhead maneuvers such as combing hair is significantly limiting with respect to strength in the left arm/hand. The patient has not been to physical therapy.  He has trialed conservative measures including nsaids, tylenol for the pain but has not trialed any steroids. He has never had interventional pain procedures in the past including any cervical interlaminar epidural steroid injections in the past for this pain. Denies any gait abnormality, saddle anesthesia or bowel/bladder abnormality.    I have personally reviewed and/or updated the patient's past medical history, past surgical history, family history, social history, current medications, allergies, and vital signs today.     Review of Systems   Constitutional:  Positive for activity change.   HENT: Negative.     Eyes: Negative.    Respiratory: Negative.     Cardiovascular: Negative.    Gastrointestinal: Negative.    Endocrine: Negative.    Genitourinary: Negative.    Musculoskeletal:  Positive for arthralgias, myalgias, neck pain and neck stiffness. Negative for back pain and gait problem.   Skin: Negative.    Allergic/Immunologic: Negative.    Neurological:  Positive for weakness and numbness.   Hematological: Negative.    Psychiatric/Behavioral: Negative.     All other systems reviewed and are negative.      Patient Active Problem List   Diagnosis    Xiphodynia    Anxiety    Asthma    High blood pressure    Cervical radiculopathy    Other spondylosis with radiculopathy, cervical region       Past Medical History:   Diagnosis Date    Anxiety     Asthma     Hypertension   "      Past Surgical History:   Procedure Laterality Date    KNEE SURGERY         Family History   Problem Relation Age of Onset    Alcohol abuse Father     Diabetes Maternal Grandmother     Breast cancer Sister        Social History     Occupational History    Not on file   Tobacco Use    Smoking status: Former     Current packs/day: 0.25     Average packs/day: 0.3 packs/day for 10.0 years (2.5 ttl pk-yrs)     Types: Cigarettes    Smokeless tobacco: Never   Vaping Use    Vaping status: Never Used   Substance and Sexual Activity    Alcohol use: Never    Drug use: Never    Sexual activity: Yes     Partners: Female     Birth control/protection: OCP       Current Outpatient Medications on File Prior to Visit   Medication Sig    busPIRone (BUSPAR) 15 mg tablet Take 1 tablet (15 mg total) by mouth 2 (two) times a day Taking BID    Multiple Vitamins-Minerals (Multi Adult Gummies) CHEW      No current facility-administered medications on file prior to visit.       No Known Allergies      Physical Exam    Ht 6' 3\" (1.905 m)   Wt 111 kg (245 lb)   BMI 30.62 kg/m²     Constitutional: normal, well developed, well nourished, alert, in no distress and non-toxic and no overt pain behavior.  Eyes: anicteric  HEENT: grossly intact  Neck: supple, symmetric, trachea midline and no masses   Pulmonary:even and unlabored  Cardiovascular:No edema or pitting edema present  Skin:Normal without rashes or lesions and well hydrated  Psychiatric:Mood and affect appropriate  Neurologic:Cranial Nerves II-XII grossly intact Sensation grossly intact; no clonus negative parker's. Reflexes 2+ and brisk. Spurling's maneuver positive left sided  Musculoskeletal:normal gait. 5/5 strength bilaterally with AROM in upper extremities. Significant pain with cervical facet loading bilaterally and with lateral spine rotation, ttp over cervical paraspinal muscles left sided    Imaging    XR SPINE CERVICAL 2 OR 3 VW INJURY     INDICATION: M54.12: " Radiculopathy, cervical region.     COMPARISON: None     FINDINGS:     No acute fracture or subluxation.     Anatomic alignment.     There is mild intervertebral disc space narrowing at C4/C5 and C5/C6. There are small anterior vertebral osteophytes at the C5 and C6. There is mild multilevel bilateral facet arthropathy.     Normal prevertebral soft tissues.     Clear lung apices.     IMPRESSION:     No acute osseous abnormality.     Degenerative changes as described.              Virtual Brief Visit  Name: Wil Anaya      : 1975      MRN: [id number]  Encounter Provider: Fadi Nixon MD  Encounter Date: 2025   Encounter department: [facility] [place]  :  Assessment & Plan  Other spondylosis with radiculopathy, cervical region    Orders:    MRI cervical spine without contrast; Future          History of Present Illness   HPI    Administrative Statements   Encounter provider Fadi Nixon MD    The Patient is located at Home and in the following state in which I hold an active license [place].    The patient was identified by name and date of birth. Wil Anaya was informed that this is a telemedicine visit and that the visit is being conducted through the [employer] platform. He agrees to proceed..  My office door was closed. No one else was in the room.  He acknowledged consent and understanding of privacy and security of the video platform. The patient has agreed to participate and understands they can discontinue the visit at any time.    I have spent a total time of 15 minutes in caring for this patient on the day of the visit/encounter including Diagnostic results, Prognosis, Risks and benefits of tx options, Instructions for management, Patient and family education, Importance of tx compliance, Risk factor reductions, Impressions, Counseling / Coordination of care, Documenting in the medical record, Reviewing/placing orders in the medical record (including  tests, medications, and/or procedures), Obtaining or reviewing history  , and Communicating with other healthcare professionals , not including the time spent for establishing the audio/video connection.

## 2025-06-01 ENCOUNTER — HOSPITAL ENCOUNTER (OUTPATIENT)
Dept: MRI IMAGING | Facility: HOSPITAL | Age: 50
Discharge: HOME | End: 2025-06-01
Attending: ANESTHESIOLOGY
Payer: COMMERCIAL

## 2025-06-01 DIAGNOSIS — M47.22 OTHER SPONDYLOSIS WITH RADICULOPATHY, CERVICAL REGION: ICD-10-CM

## 2025-06-01 PROCEDURE — 72141 MRI NECK SPINE W/O DYE: CPT

## 2025-06-03 ENCOUNTER — RESULTS FOLLOW-UP (OUTPATIENT)
Dept: PAIN MEDICINE | Facility: CLINIC | Age: 50
End: 2025-06-03

## 2025-06-10 ENCOUNTER — PREP FOR PROCEDURE (OUTPATIENT)
Dept: PAIN MEDICINE | Facility: CLINIC | Age: 50
End: 2025-06-10

## 2025-06-10 DIAGNOSIS — M54.12 CERVICAL RADICULOPATHY: Primary | ICD-10-CM

## 2025-06-10 NOTE — TELEPHONE ENCOUNTER
C4-C5: Disc osteophyte with right paracentral protrusion. Mild bilateral facet and uncovertebral spurring. Mild canal narrowing with contouring of the right ventral cord. No left foraminal stenosis. Mild right foraminal narrowing.     C5-C6: Shallow disc osteophyte with mild bilateral facet and uncovertebral spurring. No canal or right foraminal stenosis. Moderate left foraminal narrowing.    Above MRI findings discussed with patient via phone call; will plan for ILESI at C6-C7 to target radicular pain; informed consent to be obtained day of procedure.    
Called patient, left voicemail for call back to schedule.   
Spoke to patient and he is scheduled. Patient aware of instructions. No food/drink one hour prior. Wear comfortable clothing. A  is required. Denies blood thinners. Continue all other prescribed medications on day of procedure. Call if prescribed an antibiotic or become sick. Refrain from vaccinations two weeks prior and two weeks after. Patient aware [facility] nurse will call day prior with instructions and report time. Call with questions.    
no

## 2025-07-01 ENCOUNTER — HOSPITAL ENCOUNTER (OUTPATIENT)
Dept: INTERVENTIONAL RADIOLOGY/VASCULAR | Facility: HOSPITAL | Age: 50
Discharge: HOME | End: 2025-07-01
Attending: ANESTHESIOLOGY
Payer: COMMERCIAL

## 2025-07-01 VITALS
TEMPERATURE: 97.2 F | OXYGEN SATURATION: 98 % | DIASTOLIC BLOOD PRESSURE: 90 MMHG | HEART RATE: 56 BPM | RESPIRATION RATE: 20 BRPM | SYSTOLIC BLOOD PRESSURE: 165 MMHG

## 2025-07-01 DIAGNOSIS — M54.12 CERVICAL RADICULOPATHY: ICD-10-CM

## 2025-07-01 PROCEDURE — 62321 NJX INTERLAMINAR CRV/THRC: CPT | Performed by: ANESTHESIOLOGY

## 2025-07-01 RX ORDER — METHYLPREDNISOLONE ACETATE 80 MG/ML
INJECTION, SUSPENSION INTRA-ARTICULAR; INTRALESIONAL; INTRAMUSCULAR; PARENTERAL; SOFT TISSUE AS NEEDED
Status: COMPLETED | OUTPATIENT
Start: 2025-07-01 | End: 2025-07-01

## 2025-07-01 RX ORDER — 0.9 % SODIUM CHLORIDE 0.9 %
VIAL (ML) INJECTION AS NEEDED
Status: COMPLETED | OUTPATIENT
Start: 2025-07-01 | End: 2025-07-01

## 2025-07-01 RX ORDER — LIDOCAINE HYDROCHLORIDE 10 MG/ML
INJECTION, SOLUTION EPIDURAL; INFILTRATION; INTRACAUDAL; PERINEURAL AS NEEDED
Status: COMPLETED | OUTPATIENT
Start: 2025-07-01 | End: 2025-07-01

## 2025-07-01 RX ADMIN — LIDOCAINE HYDROCHLORIDE 5 ML: 10 INJECTION, SOLUTION EPIDURAL; INFILTRATION; INTRACAUDAL; PERINEURAL at 09:37

## 2025-07-01 RX ADMIN — METHYLPREDNISOLONE ACETATE 80 MG: 80 INJECTION, SUSPENSION INTRA-ARTICULAR; INTRALESIONAL; INTRAMUSCULAR; SOFT TISSUE at 09:40

## 2025-07-01 RX ADMIN — IOHEXOL 1 ML: 240 INJECTION, SOLUTION INTRATHECAL; INTRAVASCULAR; INTRAVENOUS; ORAL at 09:40

## 2025-07-01 RX ADMIN — SODIUM CHLORIDE 3 ML: 9 INJECTION INTRAMUSCULAR; INTRAVENOUS; SUBCUTANEOUS at 09:40

## 2025-07-01 NOTE — DISCHARGE INSTR - AVS FIRST PAGE
YOUR 2 WEEK FOLLOW UP HAS BEEN SCHEDULED; IF YOU WISH TO CHANGE THE FOLLOW UP, PLEASE CALL THE [facility] AT [place]: [phone]    EPIDURAL STEROID INJECTION DISCHARGE INSTRUCTIONS  WHAT YOU NEED TO KNOW:   An epidural steroid injection (ESTEFANI) is a procedure to inject steroid medicine into the epidural space. The epidural space is between your spinal cord and vertebrae. Steroids reduce inflammation and fluid buildup in your spine that may be causing pain. You may be given pain medicine along with the steroids.        DISCHARGE INSTRUCTIONS:   Call your local emergency number (911 in the [place]) if:   You have a seizure.    You have trouble moving your legs.    Seek care immediately if:   Blood soaks through your bandage.    You have a fever or chills, severe back pain, and the procedure area is sensitive to the touch.    You cannot control when you urinate or have a bowel movement.    Call your doctor if:   You have weakness or numbness in your legs.    Your wound is red, swollen, or draining pus.    You have nausea or are vomiting.    Your face or neck is red and you feel warm.    You have more pain than you had before the procedure.    You have swelling in your hands or feet.    You have questions or concerns about your condition or care.    Care for your wound as directed:  You may remove the bandage before you go to bed the day of your procedure. You may take a shower, but do not take a bath for at least 24 hours.   Self-care:   Do not drive,  use machines, or do strenuous activity for 24 hours after your procedure or as directed.     Continue other treatments  as directed. Steroid injections alone will not control your pain. The injections are meant to be used with other treatments, such as physical therapy.    Follow up with your doctor as directed:  Write down your questions so you remember to ask them during your visits.           ACTIVITY  Do not drive or operate machinery today.  No strenuous  activity today - bending, lifting, etc.   You may resume normal activities starting tomorrow - start slowly and as tolerated.  You may shower today, but not tub baths or hot tubs.  You may have numbness for several hours from the local anesthetics. Please use caution and common sense, especially with weight-bearing activities.    CARE OF THE INJECTION SITE  If you have soreness or pain apply ice to the area today (20 minutes on and 20 minutes off).  Starting tomorrow, you may resume normal activities  Notify the [facility] if you have any of the following: redness, drainage, swelling or fever above 100°F.      MEDICATIONS  Continue to take all routine medications.  Our office may have instructed you to hold some medications. You may restart medications, including blood thinners.

## 2025-07-14 ENCOUNTER — OFFICE VISIT (OUTPATIENT)
Dept: PAIN MEDICINE | Facility: CLINIC | Age: 50
End: 2025-07-14
Payer: COMMERCIAL

## 2025-07-14 VITALS — BODY MASS INDEX: 30.21 KG/M2 | HEIGHT: 75 IN | WEIGHT: 243 LBS

## 2025-07-14 DIAGNOSIS — M47.22 OTHER SPONDYLOSIS WITH RADICULOPATHY, CERVICAL REGION: Primary | ICD-10-CM

## 2025-07-14 PROCEDURE — 99213 OFFICE O/P EST LOW 20 MIN: CPT | Performed by: ANESTHESIOLOGY

## 2025-07-14 NOTE — ASSESSMENT & PLAN NOTE
-f/u prn  -continue home physical therapy for cervical radiculopathy; Physician directed home exercise plan as per AAOS demonstrated and handouts provided that patient plans to participate with for 1 hour, twice a week for the next 6 weeks.

## 2025-07-14 NOTE — PATIENT INSTRUCTIONS
Patient Education     Neck Pain Exercises   About this topic   The neck or cervical spine has 7 spinal bones that run from the base of your skull to the upper back. These spinal bones have discs in between them. Discs act as shock absorbers. Ligaments are strong bands of tissue that hold the bones together. Many muscles surround and attach on these bones. Nerves come off of the spinal cord and exit out of small spaces in between the spinal bones. You can have neck pain if any of these are injured or damaged. Exercises may help to make this problem better.  General   Before starting with a program, ask your doctor if you are healthy enough to do these exercises. Your doctor may have you work with a  or physical therapist to make a safe exercise program to meet your needs. You should not do the exercises if they cause sharp pains, if you feel dizzy, or if you have vision changes.  Stretching Exercises   Stretching exercises keep your muscles flexible. They also stop them from getting tight. Start by doing each of these stretches 2 to 3 times. In order for your body to make changes, you will need to hold these stretches for 20 to 30 seconds. Try to do the stretches 2 to 3 times each day. Do all exercises slowly.  Passive neck stretches:  Put your left hand on top of your head. Your other arm can be at your side or behind your back. Pull your head toward your left shoulder until you feel a gentle stretch on the right side of your neck. Repeat on the other side using your other hand.  Also, try this stretch by pulling in a diagonal direction. With your left hand on top of your head, pull your head down towards the direction of your left knee. You should feel this stretch toward the back on the right side of your neck. Repeat on the other side.  Active neck stretches:  Neck front-to-back motion ? Look down to the floor until you feel a stretch in the back of your neck. Hold. Next, look up to the ceiling until you  feel a stretch in the front of your neck. Hold.  Neck side-to-side motion ? Tilt your head to the side and bring your ear to your shoulder until you feel a stretch on the other side of your neck. Hold. Next, tilt your head to the other side until you feel a stretch. Hold.  Neck turning ? Turn only your head and look over your left shoulder until you feel stretching in the right side of your neck. Hold. Now turn only your head and look over your right shoulder until you feel a stretch in the left side of your neck. Hold.  Scalene stretches ? Grasp your head with the hand opposite the side you want to stretch. Pull your head to the side until you feel a stretch. Now, slowly turn your head so your chin is pointed upwards.  Chin tucks ? Stand straight or lie down on your back. Tuck your chin in and lengthen the back of your neck. Return to the starting position and repeat. It may help to stand up against a wall during this exercise. Try gently pushing your chin with two fingers while trying to flatten your neck against the wall. If you do this exercise lying down, try using a small rolled up washcloth under your neck. Push down into the washcloth when tucking in your chin.  Strengthening Exercises   Strengthening exercises keep your muscles firm and strong. Start by repeating each exercise 2 to 3 times. Work up to doing each exercise 10 times. Try to do the exercises 2 to 3 times each day. Hold each exercise for 3 to 5 seconds. Do all exercises slowly.  Shoulder blade squeezes ? Pinch your shoulder blades together on your upper back and hold 3 to 5 seconds. Relax.             What will the results be?   Less pain and stiffness  Better range of motion  Increased strength  Help you heal faster after an injury or surgery  Increase blood flow to a body part  Help you feel better and more relaxed  Give you more energy  More toned looking muscles  Better posture  Easier to do daily activities  Helpful tips   Stay active and  work out to keep your muscles strong and flexible.  Be sure you do not hold your breath when exercising. This can raise your blood pressure. If you tend to hold your breath, try counting out loud when exercising. If any exercise bothers you, stop right away.  Try swinging your arms at an easy pace for a few minutes to warm up your muscles. Do this again after exercising.  Doing exercises before a meal may be a good way to get into a routine.  Exercise may be slightly uncomfortable, but you should not have sharp pains. If you do get sharp pains, stop what you are doing. If the sharp pains continue, call your doctor.  Last Reviewed Date   2020-03-10  Consumer Information Use and Disclaimer   This generalized information is a limited summary of diagnosis, treatment, and/or medication information. It is not meant to be comprehensive and should be used as a tool to help the user understand and/or assess potential diagnostic and treatment options. It does NOT include all information about conditions, treatments, medications, side effects, or risks that may apply to a specific patient. It is not intended to be medical advice or a substitute for the medical advice, diagnosis, or treatment of a health care provider based on the health care provider's examination and assessment of a patient’s specific and unique circumstances. Patients must speak with a health care provider for complete information about their health, medical questions, and treatment options, including any risks or benefits regarding use of medications. This information does not endorse any treatments or medications as safe, effective, or approved for treating a specific patient. [employer] and its affiliates disclaim any warranty or liability relating to this information or the use thereof. The use of this information is governed by the Terms of Use, available at https://www.[employer] Technologiesuwer.com/en/know/clinical-effectiveness-terms   Copyright   Copyright ©  2024 [employer] and its affiliates and/or licensors. All rights reserved.

## 2025-07-14 NOTE — PROGRESS NOTES
Name: Wil Anaya      : 1975      MRN: [id number]  Encounter Provider: Fadi Nixon MD  Encounter Date: 2025   Encounter department: [facility] [place]  :  Assessment & Plan  Other spondylosis with radiculopathy, cervical region    -f/u prn  -continue home physical therapy for cervical radiculopathy; Physician directed home exercise plan as per [facility] demonstrated and handouts provided that patient plans to participate with for 1 hour, twice a week for the next 6 weeks.       Complete risks and benefits including bleeding, infection, tissue reaction, nerve injury and allergic reaction were discussed. The approach was demonstrated using models and literature was provided. Verbal and written consent was obtained.    My impressions and treatment recommendations were discussed in detail with the patient who verbalized understanding and had no further questions.  Discharge instructions were provided. I personally saw and examined the patient and I agree with the above discussed plan of care.    History of Present Illness     Wil Anaya is a 50 y.o. male who presents for a follow up office visit in regards to Follow-up from [facility]. The patient’s current symptoms include resolution of left sided cervical radicular pain in C5 and C6 dermatomal distribution accompanied by pain limited weakness numbness and paresthesias. Current pain level is 0/10. Denies any weakness, numbness or paresthesias.      Review of Systems   Constitutional:  Positive for activity change.   HENT: Negative.     Eyes: Negative.    Respiratory: Negative.     Cardiovascular: Negative.    Gastrointestinal: Negative.    Endocrine: Negative.    Genitourinary: Negative.    Musculoskeletal:  Positive for arthralgias, myalgias, neck pain and neck stiffness. Negative for back pain and gait problem.   Skin: Negative.    Allergic/Immunologic: Negative.    Neurological:  Positive for weakness and  "numbness.   Hematological: Negative.    Psychiatric/Behavioral: Negative.     All other systems reviewed and are negative.      Medical History Reviewed by provider this encounter:     .       Objective   Ht 6' 3\" (1.905 m)   Wt 110 kg (243 lb)   BMI 30.37 kg/m²         Physical Exam    Constitutional: normal, well developed, well nourished, alert, in no distress and non-toxic and no overt pain behavior.  Psychiatric:Mood and affect appropriate  Neurologic:Cranial Nerves II-XII grossly intact Sensation grossly intact; no clonus negative parker's. Reflexes 2+ and brisk. Spurling's positive left sided  Musculoskeletal:normal gait. 5/5 strength bilaterally with AROM in upper extremities. Significant pain with cervical facet loading bilaterally and with lateral spine rotation, ttp over cervical paraspinal muscles, left greater than right.     Review of external notes including any available PT notes, PCP notes and specialist notes was performed at this visit in addition to review of new ordered imaging and past imaging to develop or modify multidisciplinary pain plan    Radiology Results Review: I personally reviewed the following imaging studies: MRI cervical spine. My interpretation is disc ostophyte complex at C5-6 contributing to moderate degree of left sided foraminal narrowing.    Administrative Statements   I have spent a total time of 30 minutes in caring for this patient on the day of the visit/encounter including Diagnostic results, prognosis, risks and benefits of tx options, instructions for management, patient and family education, importance of tx compliance, risk factor reductions, Impressions, counseling/coordination of care, documenting in the medical record, reviewing/placing orders in the medical record (including tests, medications, and/or procedures), obtaining or reviewing history and communicating with other healthcare professionals .    "